# Patient Record
Sex: FEMALE | Race: WHITE | Employment: OTHER | URBAN - METROPOLITAN AREA
[De-identification: names, ages, dates, MRNs, and addresses within clinical notes are randomized per-mention and may not be internally consistent; named-entity substitution may affect disease eponyms.]

---

## 2017-01-05 PROBLEM — H35.372 EPIRETINAL MEMBRANE, LEFT: Status: ACTIVE | Noted: 2017-01-05

## 2017-01-05 PROBLEM — H35.363 DRUSEN (DEGENERATIVE) OF RETINA, BILATERAL: Status: ACTIVE | Noted: 2017-01-05

## 2017-02-03 PROCEDURE — 88305 TISSUE EXAM BY PATHOLOGIST: CPT | Performed by: RADIOLOGY

## 2017-02-15 PROBLEM — M47.814 THORACIC ARTHRITIS: Status: ACTIVE | Noted: 2017-02-15

## 2017-02-15 PROBLEM — M47.812 CERVICAL ARTHRITIS: Status: ACTIVE | Noted: 2017-02-15

## 2017-02-15 PROBLEM — M47.816 ARTHRITIS OF LUMBAR SPINE: Status: ACTIVE | Noted: 2017-02-15

## 2017-02-15 PROBLEM — M85.89 OSTEOPENIA OF MULTIPLE SITES: Status: ACTIVE | Noted: 2017-02-15

## 2017-04-25 PROBLEM — I27.20 PULMONARY HYPERTENSION (HCC): Status: ACTIVE | Noted: 2017-04-25

## 2018-01-17 PROBLEM — M47.819 SPINAL ARTHRITIS: Status: ACTIVE | Noted: 2017-02-15

## 2018-06-14 PROBLEM — Z91.81 AT RISK FOR FALLS: Status: ACTIVE | Noted: 2018-06-14

## 2018-07-30 PROBLEM — R00.2 PALPITATIONS: Status: ACTIVE | Noted: 2018-07-30

## 2018-07-30 PROBLEM — R42 DIZZINESS: Status: ACTIVE | Noted: 2018-07-30

## 2018-08-15 PROBLEM — I48.91 ATRIAL FIBRILLATION, UNSPECIFIED TYPE (HCC): Status: ACTIVE | Noted: 2018-08-15

## 2018-08-15 PROBLEM — R00.2 PALPITATIONS: Status: RESOLVED | Noted: 2018-07-30 | Resolved: 2018-08-15

## 2019-06-18 PROBLEM — M47.819 SPINAL ARTHRITIS: Status: RESOLVED | Noted: 2017-02-15 | Resolved: 2019-06-18

## 2019-07-18 PROBLEM — H35.373 EPIRETINAL MEMBRANE (ERM) OF BOTH EYES: Status: ACTIVE | Noted: 2017-01-05

## 2019-08-27 PROBLEM — Z79.01 LONG TERM (CURRENT) USE OF ANTICOAGULANTS: Status: ACTIVE | Noted: 2019-08-27

## 2019-10-24 PROBLEM — I48.91 ATRIAL FIBRILLATION, UNSPECIFIED TYPE (HCC): Status: RESOLVED | Noted: 2018-08-15 | Resolved: 2019-10-24

## 2019-10-24 PROBLEM — Z79.01 LONG TERM (CURRENT) USE OF ANTICOAGULANTS: Status: RESOLVED | Noted: 2019-08-27 | Resolved: 2019-10-24

## 2020-07-21 PROBLEM — D68.69 SECONDARY HYPERCOAGULABLE STATE (HCC): Status: ACTIVE | Noted: 2020-07-21

## 2020-07-21 PROBLEM — I48.91 ATRIAL FIBRILLATION (HCC): Status: ACTIVE | Noted: 2020-07-21

## 2021-04-27 PROBLEM — M47.816 ARTHRITIS OF LUMBAR SPINE: Status: RESOLVED | Noted: 2017-02-15 | Resolved: 2021-04-27

## 2021-04-27 PROBLEM — R42 DIZZINESS: Status: RESOLVED | Noted: 2018-07-30 | Resolved: 2021-04-27

## 2021-07-29 PROBLEM — I27.20 PULMONARY HYPERTENSION (HCC): Status: RESOLVED | Noted: 2017-04-25 | Resolved: 2021-07-29

## 2022-01-25 PROBLEM — I65.23 MILD ATHEROSCLEROSIS OF CAROTID ARTERY, BILATERAL: Status: ACTIVE | Noted: 2022-01-25

## 2022-01-25 PROBLEM — J43.9 EMPHYSEMA, UNSPECIFIED (HCC): Status: ACTIVE | Noted: 2022-01-25

## 2022-01-26 PROBLEM — M46.92 UNSPECIFIED INFLAMMATORY SPONDYLOPATHY, CERVICAL REGION (HCC): Status: ACTIVE | Noted: 2022-01-26

## 2023-01-01 ENCOUNTER — APPOINTMENT (OUTPATIENT)
Dept: GENERAL RADIOLOGY | Facility: HOSPITAL | Age: 83
End: 2023-01-01
Attending: EMERGENCY MEDICINE
Payer: MEDICARE

## 2023-01-01 ENCOUNTER — APPOINTMENT (OUTPATIENT)
Dept: CT IMAGING | Facility: HOSPITAL | Age: 83
End: 2023-01-01
Attending: STUDENT IN AN ORGANIZED HEALTH CARE EDUCATION/TRAINING PROGRAM
Payer: MEDICARE

## 2023-01-01 ENCOUNTER — HOSPITAL ENCOUNTER (INPATIENT)
Facility: HOSPITAL | Age: 83
LOS: 3 days | Discharge: HOSPICE | End: 2023-01-01
Attending: EMERGENCY MEDICINE | Admitting: INTERNAL MEDICINE
Payer: MEDICARE

## 2023-01-01 ENCOUNTER — HOSPITAL ENCOUNTER (INPATIENT)
Facility: HOSPITAL | Age: 83
LOS: 3 days | End: 2023-01-01
Attending: STUDENT IN AN ORGANIZED HEALTH CARE EDUCATION/TRAINING PROGRAM | Admitting: STUDENT IN AN ORGANIZED HEALTH CARE EDUCATION/TRAINING PROGRAM
Payer: OTHER MISCELLANEOUS

## 2023-01-01 ENCOUNTER — APPOINTMENT (OUTPATIENT)
Dept: GENERAL RADIOLOGY | Facility: HOSPITAL | Age: 83
End: 2023-01-01
Attending: ORTHOPAEDIC SURGERY
Payer: MEDICARE

## 2023-01-01 VITALS
HEIGHT: 57 IN | HEART RATE: 88 BPM | OXYGEN SATURATION: 100 % | BODY MASS INDEX: 24.81 KG/M2 | WEIGHT: 115 LBS | DIASTOLIC BLOOD PRESSURE: 60 MMHG | TEMPERATURE: 97 F | RESPIRATION RATE: 16 BRPM | SYSTOLIC BLOOD PRESSURE: 115 MMHG

## 2023-01-01 VITALS — TEMPERATURE: 97 F

## 2023-01-01 DIAGNOSIS — S72.002A CLOSED LEFT HIP FRACTURE, INITIAL ENCOUNTER (HCC): Primary | ICD-10-CM

## 2023-01-01 LAB
ANION GAP SERPL CALC-SCNC: 1 MMOL/L (ref 0–18)
ANION GAP SERPL CALC-SCNC: 10 MMOL/L (ref 0–18)
ANION GAP SERPL CALC-SCNC: 3 MMOL/L (ref 0–18)
ANION GAP SERPL CALC-SCNC: 3 MMOL/L (ref 0–18)
ANION GAP SERPL CALC-SCNC: 6 MMOL/L (ref 0–18)
ANTIBODY SCREEN: NEGATIVE
APTT PPP: 29.9 SECONDS (ref 23.3–35.6)
APTT PPP: 88.4 SECONDS (ref 23.3–35.6)
BASE EXCESS BLD CALC-SCNC: -3.1 MMOL/L (ref ?–2)
BASOPHILS # BLD AUTO: 0.05 X10(3) UL (ref 0–0.2)
BASOPHILS NFR BLD AUTO: 0.3 %
BILIRUB UR QL: NEGATIVE
BLOOD TYPE BARCODE: 600
BUN BLD-MCNC: 31 MG/DL (ref 7–18)
BUN BLD-MCNC: 33 MG/DL (ref 7–18)
BUN BLD-MCNC: 37 MG/DL (ref 7–18)
BUN BLD-MCNC: 38 MG/DL (ref 7–18)
BUN BLD-MCNC: 38 MG/DL (ref 7–18)
BUN/CREAT SERPL: 23.1 (ref 10–20)
BUN/CREAT SERPL: 23.5 (ref 10–20)
BUN/CREAT SERPL: 23.9 (ref 10–20)
BUN/CREAT SERPL: 24.2 (ref 10–20)
BUN/CREAT SERPL: 25 (ref 10–20)
CALCIUM BLD-MCNC: 8.2 MG/DL (ref 8.5–10.1)
CALCIUM BLD-MCNC: 8.3 MG/DL (ref 8.5–10.1)
CALCIUM BLD-MCNC: 8.6 MG/DL (ref 8.5–10.1)
CALCIUM BLD-MCNC: 8.6 MG/DL (ref 8.5–10.1)
CALCIUM BLD-MCNC: 9.1 MG/DL (ref 8.5–10.1)
CHLORIDE SERPL-SCNC: 108 MMOL/L (ref 98–112)
CHLORIDE SERPL-SCNC: 116 MMOL/L (ref 98–112)
CHLORIDE SERPL-SCNC: 116 MMOL/L (ref 98–112)
CHLORIDE SERPL-SCNC: 117 MMOL/L (ref 98–112)
CHLORIDE SERPL-SCNC: 117 MMOL/L (ref 98–112)
CLARITY UR: CLEAR
CO2 SERPL-SCNC: 21 MMOL/L (ref 21–32)
CO2 SERPL-SCNC: 23 MMOL/L (ref 21–32)
CO2 SERPL-SCNC: 23 MMOL/L (ref 21–32)
CO2 SERPL-SCNC: 24 MMOL/L (ref 21–32)
CO2 SERPL-SCNC: 25 MMOL/L (ref 21–32)
CREAT BLD-MCNC: 1.28 MG/DL
CREAT BLD-MCNC: 1.43 MG/DL
CREAT BLD-MCNC: 1.52 MG/DL
CREAT BLD-MCNC: 1.55 MG/DL
CREAT BLD-MCNC: 1.62 MG/DL
DEPRECATED RDW RBC AUTO: 44.8 FL (ref 35.1–46.3)
DEPRECATED RDW RBC AUTO: 47 FL (ref 35.1–46.3)
DEPRECATED RDW RBC AUTO: 48.7 FL (ref 35.1–46.3)
DEPRECATED RDW RBC AUTO: 51.8 FL (ref 35.1–46.3)
EGFRCR SERPLBLD CKD-EPI 2021: 31 ML/MIN/1.73M2 (ref 60–?)
EGFRCR SERPLBLD CKD-EPI 2021: 33 ML/MIN/1.73M2 (ref 60–?)
EGFRCR SERPLBLD CKD-EPI 2021: 33 ML/MIN/1.73M2 (ref 60–?)
EGFRCR SERPLBLD CKD-EPI 2021: 36 ML/MIN/1.73M2 (ref 60–?)
EGFRCR SERPLBLD CKD-EPI 2021: 41 ML/MIN/1.73M2 (ref 60–?)
EOSINOPHIL # BLD AUTO: 0.07 X10(3) UL (ref 0–0.7)
EOSINOPHIL NFR BLD AUTO: 0.4 %
ERYTHROCYTE [DISTWIDTH] IN BLOOD BY AUTOMATED COUNT: 12.5 % (ref 11–15)
ERYTHROCYTE [DISTWIDTH] IN BLOOD BY AUTOMATED COUNT: 13.1 % (ref 11–15)
ERYTHROCYTE [DISTWIDTH] IN BLOOD BY AUTOMATED COUNT: 13.2 % (ref 11–15)
ERYTHROCYTE [DISTWIDTH] IN BLOOD BY AUTOMATED COUNT: 13.6 % (ref 11–15)
GLUCOSE BLD-MCNC: 103 MG/DL (ref 70–99)
GLUCOSE BLD-MCNC: 130 MG/DL (ref 70–99)
GLUCOSE BLD-MCNC: 181 MG/DL (ref 70–99)
GLUCOSE BLD-MCNC: 95 MG/DL (ref 70–99)
GLUCOSE BLD-MCNC: 98 MG/DL (ref 70–99)
GLUCOSE BLDC GLUCOMTR-MCNC: 137 MG/DL (ref 70–99)
GLUCOSE UR-MCNC: NORMAL MG/DL
HCO3 BLDA-SCNC: 22.3 MEQ/L (ref 21–27)
HCT VFR BLD AUTO: 23.1 %
HCT VFR BLD AUTO: 25.6 %
HCT VFR BLD AUTO: 29.1 %
HCT VFR BLD AUTO: 35.4 %
HGB BLD-MCNC: 11.2 G/DL
HGB BLD-MCNC: 6.9 G/DL
HGB BLD-MCNC: 7.6 G/DL
HGB BLD-MCNC: 7.6 G/DL
HGB BLD-MCNC: 8.9 G/DL
HGB BLD-MCNC: 9 G/DL
HGB UR QL STRIP.AUTO: NEGATIVE
IMM GRANULOCYTES # BLD AUTO: 0.09 X10(3) UL (ref 0–1)
IMM GRANULOCYTES NFR BLD: 0.5 %
KETONES UR-MCNC: NEGATIVE MG/DL
LEUKOCYTE ESTERASE UR QL STRIP.AUTO: NEGATIVE
LYMPHOCYTES # BLD AUTO: 3.07 X10(3) UL (ref 1–4)
LYMPHOCYTES NFR BLD AUTO: 15.7 %
MCH RBC QN AUTO: 30.6 PG (ref 26–34)
MCH RBC QN AUTO: 30.8 PG (ref 26–34)
MCH RBC QN AUTO: 30.9 PG (ref 26–34)
MCH RBC QN AUTO: 30.9 PG (ref 26–34)
MCHC RBC AUTO-ENTMCNC: 29.7 G/DL (ref 31–37)
MCHC RBC AUTO-ENTMCNC: 29.9 G/DL (ref 31–37)
MCHC RBC AUTO-ENTMCNC: 30.6 G/DL (ref 31–37)
MCHC RBC AUTO-ENTMCNC: 31.6 G/DL (ref 31–37)
MCV RBC AUTO: 100 FL
MCV RBC AUTO: 103.6 FL
MCV RBC AUTO: 103.6 FL
MCV RBC AUTO: 97.5 FL
MONOCYTES # BLD AUTO: 1.05 X10(3) UL (ref 0.1–1)
MONOCYTES NFR BLD AUTO: 5.4 %
MRSA DNA SPEC QL NAA+PROBE: NEGATIVE
MRSA NASAL: NEGATIVE
NEUTROPHILS # BLD AUTO: 15.24 X10 (3) UL (ref 1.5–7.7)
NEUTROPHILS # BLD AUTO: 15.24 X10(3) UL (ref 1.5–7.7)
NEUTROPHILS NFR BLD AUTO: 77.7 %
NITRITE UR QL STRIP.AUTO: NEGATIVE
O2 CT BLD-SCNC: 10.2 VOL% (ref 15–23)
OSMOLALITY SERPL CALC.SUM OF ELEC: 301 MOSM/KG (ref 275–295)
OSMOLALITY SERPL CALC.SUM OF ELEC: 303 MOSM/KG (ref 275–295)
OSMOLALITY SERPL CALC.SUM OF ELEC: 305 MOSM/KG (ref 275–295)
OSMOLALITY SERPL CALC.SUM OF ELEC: 305 MOSM/KG (ref 275–295)
OSMOLALITY SERPL CALC.SUM OF ELEC: 309 MOSM/KG (ref 275–295)
OXYGEN LITERS/MINUTE: 8 L/MIN
PCO2 BLDA: 43 MM HG (ref 35–45)
PH BLDA: 7.33 [PH] (ref 7.35–7.45)
PH UR: 5 [PH] (ref 5–8)
PLATELET # BLD AUTO: 124 10(3)UL (ref 150–450)
PLATELET # BLD AUTO: 132 10(3)UL (ref 150–450)
PLATELET # BLD AUTO: 145 10(3)UL (ref 150–450)
PLATELET # BLD AUTO: 236 10(3)UL (ref 150–450)
PO2 BLDA: 49 MM HG (ref 80–100)
POTASSIUM SERPL-SCNC: 4.3 MMOL/L (ref 3.5–5.1)
POTASSIUM SERPL-SCNC: 4.3 MMOL/L (ref 3.5–5.1)
POTASSIUM SERPL-SCNC: 5.3 MMOL/L (ref 3.5–5.1)
POTASSIUM SERPL-SCNC: 5.3 MMOL/L (ref 3.5–5.1)
POTASSIUM SERPL-SCNC: 5.4 MMOL/L (ref 3.5–5.1)
PUNCTURE CHARGE: YES
RBC # BLD AUTO: 2.23 X10(6)UL
RBC # BLD AUTO: 2.47 X10(6)UL
RBC # BLD AUTO: 2.91 X10(6)UL
RBC # BLD AUTO: 3.63 X10(6)UL
RH BLOOD TYPE: NEGATIVE
RH BLOOD TYPE: NEGATIVE
SAO2 % BLDA: 88.5 % (ref 94–100)
SODIUM SERPL-SCNC: 141 MMOL/L (ref 136–145)
SODIUM SERPL-SCNC: 142 MMOL/L (ref 136–145)
SODIUM SERPL-SCNC: 142 MMOL/L (ref 136–145)
SODIUM SERPL-SCNC: 143 MMOL/L (ref 136–145)
SODIUM SERPL-SCNC: 145 MMOL/L (ref 136–145)
SP GR UR STRIP: 1.02 (ref 1–1.03)
STAPH A BY PCR: POSITIVE
UNIT VOLUME: 350 ML
UROBILINOGEN UR STRIP-ACNC: NORMAL
WBC # BLD AUTO: 10.6 X10(3) UL (ref 4–11)
WBC # BLD AUTO: 10.8 X10(3) UL (ref 4–11)
WBC # BLD AUTO: 14.3 X10(3) UL (ref 4–11)
WBC # BLD AUTO: 19.6 X10(3) UL (ref 4–11)

## 2023-01-01 PROCEDURE — 80048 BASIC METABOLIC PNL TOTAL CA: CPT | Performed by: INTERNAL MEDICINE

## 2023-01-01 PROCEDURE — 92610 EVALUATE SWALLOWING FUNCTION: CPT

## 2023-01-01 PROCEDURE — 0QS706Z REPOSITION LEFT UPPER FEMUR WITH INTRAMEDULLARY INTERNAL FIXATION DEVICE, OPEN APPROACH: ICD-10-PCS | Performed by: ORTHOPAEDIC SURGERY

## 2023-01-01 PROCEDURE — 80048 BASIC METABOLIC PNL TOTAL CA: CPT | Performed by: STUDENT IN AN ORGANIZED HEALTH CARE EDUCATION/TRAINING PROGRAM

## 2023-01-01 PROCEDURE — 36600 WITHDRAWAL OF ARTERIAL BLOOD: CPT | Performed by: STUDENT IN AN ORGANIZED HEALTH CARE EDUCATION/TRAINING PROGRAM

## 2023-01-01 PROCEDURE — 87641 MR-STAPH DNA AMP PROBE: CPT | Performed by: EMERGENCY MEDICINE

## 2023-01-01 PROCEDURE — 81003 URINALYSIS AUTO W/O SCOPE: CPT | Performed by: STUDENT IN AN ORGANIZED HEALTH CARE EDUCATION/TRAINING PROGRAM

## 2023-01-01 PROCEDURE — 85018 HEMOGLOBIN: CPT | Performed by: STUDENT IN AN ORGANIZED HEALTH CARE EDUCATION/TRAINING PROGRAM

## 2023-01-01 PROCEDURE — 80048 BASIC METABOLIC PNL TOTAL CA: CPT | Performed by: HOSPITALIST

## 2023-01-01 PROCEDURE — 86900 BLOOD TYPING SEROLOGIC ABO: CPT | Performed by: ORTHOPAEDIC SURGERY

## 2023-01-01 PROCEDURE — BQ111ZZ FLUOROSCOPY OF LEFT HIP USING LOW OSMOLAR CONTRAST: ICD-10-PCS | Performed by: ORTHOPAEDIC SURGERY

## 2023-01-01 PROCEDURE — 94669 MECHANICAL CHEST WALL OSCILL: CPT

## 2023-01-01 PROCEDURE — S0028 INJECTION, FAMOTIDINE, 20 MG: HCPCS

## 2023-01-01 PROCEDURE — 85025 COMPLETE CBC W/AUTO DIFF WBC: CPT | Performed by: EMERGENCY MEDICINE

## 2023-01-01 PROCEDURE — 94799 UNLISTED PULMONARY SVC/PX: CPT

## 2023-01-01 PROCEDURE — 86901 BLOOD TYPING SEROLOGIC RH(D): CPT | Performed by: ORTHOPAEDIC SURGERY

## 2023-01-01 PROCEDURE — 71260 CT THORAX DX C+: CPT | Performed by: STUDENT IN AN ORGANIZED HEALTH CARE EDUCATION/TRAINING PROGRAM

## 2023-01-01 PROCEDURE — 85018 HEMOGLOBIN: CPT | Performed by: INTERNAL MEDICINE

## 2023-01-01 PROCEDURE — 82805 BLOOD GASES W/O2 SATURATION: CPT | Performed by: STUDENT IN AN ORGANIZED HEALTH CARE EDUCATION/TRAINING PROGRAM

## 2023-01-01 PROCEDURE — 73502 X-RAY EXAM HIP UNI 2-3 VIEWS: CPT | Performed by: ORTHOPAEDIC SURGERY

## 2023-01-01 PROCEDURE — 80048 BASIC METABOLIC PNL TOTAL CA: CPT | Performed by: EMERGENCY MEDICINE

## 2023-01-01 PROCEDURE — 85027 COMPLETE CBC AUTOMATED: CPT | Performed by: INTERNAL MEDICINE

## 2023-01-01 PROCEDURE — 85060 BLOOD SMEAR INTERPRETATION: CPT | Performed by: STUDENT IN AN ORGANIZED HEALTH CARE EDUCATION/TRAINING PROGRAM

## 2023-01-01 PROCEDURE — 71045 X-RAY EXAM CHEST 1 VIEW: CPT | Performed by: EMERGENCY MEDICINE

## 2023-01-01 PROCEDURE — 85027 COMPLETE CBC AUTOMATED: CPT | Performed by: STUDENT IN AN ORGANIZED HEALTH CARE EDUCATION/TRAINING PROGRAM

## 2023-01-01 PROCEDURE — 85014 HEMATOCRIT: CPT | Performed by: STUDENT IN AN ORGANIZED HEALTH CARE EDUCATION/TRAINING PROGRAM

## 2023-01-01 PROCEDURE — 76000 FLUOROSCOPY <1 HR PHYS/QHP: CPT | Performed by: ORTHOPAEDIC SURGERY

## 2023-01-01 PROCEDURE — 87641 MR-STAPH DNA AMP PROBE: CPT | Performed by: ORTHOPAEDIC SURGERY

## 2023-01-01 PROCEDURE — 82962 GLUCOSE BLOOD TEST: CPT

## 2023-01-01 PROCEDURE — 73502 X-RAY EXAM HIP UNI 2-3 VIEWS: CPT | Performed by: EMERGENCY MEDICINE

## 2023-01-01 PROCEDURE — 86920 COMPATIBILITY TEST SPIN: CPT

## 2023-01-01 PROCEDURE — 94667 MNPJ CHEST WALL 1ST: CPT

## 2023-01-01 PROCEDURE — 85730 THROMBOPLASTIN TIME PARTIAL: CPT | Performed by: INTERNAL MEDICINE

## 2023-01-01 PROCEDURE — 87640 STAPH A DNA AMP PROBE: CPT | Performed by: EMERGENCY MEDICINE

## 2023-01-01 PROCEDURE — 30233N1 TRANSFUSION OF NONAUTOLOGOUS RED BLOOD CELLS INTO PERIPHERAL VEIN, PERCUTANEOUS APPROACH: ICD-10-PCS | Performed by: STUDENT IN AN ORGANIZED HEALTH CARE EDUCATION/TRAINING PROGRAM

## 2023-01-01 PROCEDURE — 86850 RBC ANTIBODY SCREEN: CPT | Performed by: ORTHOPAEDIC SURGERY

## 2023-01-01 DEVICE — IMPLANTABLE DEVICE: Type: IMPLANTABLE DEVICE | Status: FUNCTIONAL

## 2023-01-01 RX ORDER — AMLODIPINE BESYLATE AND BENAZEPRIL HYDROCHLORIDE 10; 20 MG/1; MG/1
1 CAPSULE ORAL DAILY
Status: DISCONTINUED | OUTPATIENT
Start: 2023-01-01 | End: 2023-01-01 | Stop reason: RX

## 2023-01-01 RX ORDER — MORPHINE SULFATE 2 MG/ML
1 INJECTION, SOLUTION INTRAMUSCULAR; INTRAVENOUS EVERY 2 HOUR PRN
Status: DISCONTINUED | OUTPATIENT
Start: 2023-01-01 | End: 2023-01-01

## 2023-01-01 RX ORDER — NICOTINE POLACRILEX 4 MG
30 LOZENGE BUCCAL
Status: DISCONTINUED | OUTPATIENT
Start: 2023-01-01 | End: 2023-01-01 | Stop reason: HOSPADM

## 2023-01-01 RX ORDER — HALOPERIDOL 5 MG/ML
1 INJECTION INTRAMUSCULAR
Status: DISCONTINUED | OUTPATIENT
Start: 2023-01-01 | End: 2023-01-01

## 2023-01-01 RX ORDER — SODIUM CHLORIDE 9 MG/ML
INJECTION, SOLUTION INTRAVENOUS ONCE
Status: COMPLETED | OUTPATIENT
Start: 2023-01-01 | End: 2023-01-01

## 2023-01-01 RX ORDER — LORAZEPAM 2 MG/ML
2 INJECTION INTRAMUSCULAR EVERY 4 HOURS PRN
Status: DISCONTINUED | OUTPATIENT
Start: 2023-01-01 | End: 2023-01-01

## 2023-01-01 RX ORDER — LORAZEPAM 2 MG/ML
1 INJECTION INTRAMUSCULAR EVERY 4 HOURS PRN
Status: DISCONTINUED | OUTPATIENT
Start: 2023-01-01 | End: 2023-01-01

## 2023-01-01 RX ORDER — MORPHINE SULFATE 2 MG/ML
1 INJECTION, SOLUTION INTRAMUSCULAR; INTRAVENOUS
Status: DISCONTINUED | OUTPATIENT
Start: 2023-01-01 | End: 2023-01-01

## 2023-01-01 RX ORDER — FUROSEMIDE 10 MG/ML
40 INJECTION INTRAMUSCULAR; INTRAVENOUS EVERY 8 HOURS PRN
Status: DISCONTINUED | OUTPATIENT
Start: 2023-01-01 | End: 2023-01-01

## 2023-01-01 RX ORDER — HYDROMORPHONE HYDROCHLORIDE 1 MG/ML
0.2 INJECTION, SOLUTION INTRAMUSCULAR; INTRAVENOUS; SUBCUTANEOUS EVERY 5 MIN PRN
Status: DISCONTINUED | OUTPATIENT
Start: 2023-01-01 | End: 2023-01-01 | Stop reason: HOSPADM

## 2023-01-01 RX ORDER — LORAZEPAM 2 MG/ML
0.5 INJECTION INTRAMUSCULAR EVERY 4 HOURS PRN
Status: DISCONTINUED | OUTPATIENT
Start: 2023-01-01 | End: 2023-01-01

## 2023-01-01 RX ORDER — FUROSEMIDE 40 MG/1
40 TABLET ORAL EVERY 8 HOURS PRN
Status: DISCONTINUED | OUTPATIENT
Start: 2023-01-01 | End: 2023-01-01

## 2023-01-01 RX ORDER — SODIUM CHLORIDE, SODIUM LACTATE, POTASSIUM CHLORIDE, CALCIUM CHLORIDE 600; 310; 30; 20 MG/100ML; MG/100ML; MG/100ML; MG/100ML
INJECTION, SOLUTION INTRAVENOUS CONTINUOUS
Status: DISCONTINUED | OUTPATIENT
Start: 2023-01-01 | End: 2023-01-01 | Stop reason: HOSPADM

## 2023-01-01 RX ORDER — HALOPERIDOL 5 MG/ML
INJECTION INTRAMUSCULAR
Status: COMPLETED
Start: 2023-01-01 | End: 2023-01-01

## 2023-01-01 RX ORDER — HEPARIN SODIUM AND DEXTROSE 10000; 5 [USP'U]/100ML; G/100ML
INJECTION INTRAVENOUS CONTINUOUS
Status: DISCONTINUED | OUTPATIENT
Start: 2023-01-01 | End: 2023-01-01

## 2023-01-01 RX ORDER — AMLODIPINE BESYLATE 10 MG/1
10 TABLET ORAL DAILY
Status: DISCONTINUED | OUTPATIENT
Start: 2023-01-01 | End: 2023-01-01

## 2023-01-01 RX ORDER — HALOPERIDOL 5 MG/ML
1 INJECTION INTRAMUSCULAR EVERY 6 HOURS PRN
Status: DISCONTINUED | OUTPATIENT
Start: 2023-01-01 | End: 2023-01-01

## 2023-01-01 RX ORDER — SODIUM CHLORIDE 0.9 % (FLUSH) 0.9 %
10 SYRINGE (ML) INJECTION AS NEEDED
Status: DISCONTINUED | OUTPATIENT
Start: 2023-01-01 | End: 2023-01-01

## 2023-01-01 RX ORDER — CHOLECALCIFEROL (VITAMIN D3) 25 MCG
1000 TABLET,CHEWABLE ORAL DAILY
COMMUNITY
Start: 2023-01-01 | End: 2023-10-26

## 2023-01-01 RX ORDER — GLYCOPYRROLATE 0.2 MG/ML
0.4 INJECTION, SOLUTION INTRAMUSCULAR; INTRAVENOUS
Status: DISCONTINUED | OUTPATIENT
Start: 2023-01-01 | End: 2023-01-01

## 2023-01-01 RX ORDER — FLUTICASONE FUROATE AND VILANTEROL 200; 25 UG/1; UG/1
1 POWDER RESPIRATORY (INHALATION) DAILY
Status: DISCONTINUED | OUTPATIENT
Start: 2023-01-01 | End: 2023-01-01

## 2023-01-01 RX ORDER — SCOLOPAMINE TRANSDERMAL SYSTEM 1 MG/1
1 PATCH, EXTENDED RELEASE TRANSDERMAL
Status: DISCONTINUED | OUTPATIENT
Start: 2023-01-01 | End: 2023-01-01

## 2023-01-01 RX ORDER — PANTOPRAZOLE SODIUM 40 MG/1
40 TABLET, DELAYED RELEASE ORAL
Status: DISCONTINUED | OUTPATIENT
Start: 2023-01-01 | End: 2023-01-01

## 2023-01-01 RX ORDER — DEXTROSE AND SODIUM CHLORIDE 5; .45 G/100ML; G/100ML
INJECTION, SOLUTION INTRAVENOUS CONTINUOUS
Status: DISCONTINUED | OUTPATIENT
Start: 2023-01-01 | End: 2023-01-01

## 2023-01-01 RX ORDER — HALOPERIDOL 5 MG/ML
2 INJECTION INTRAMUSCULAR
Status: DISCONTINUED | OUTPATIENT
Start: 2023-01-01 | End: 2023-01-01

## 2023-01-01 RX ORDER — NALOXONE HYDROCHLORIDE 0.4 MG/ML
0.08 INJECTION, SOLUTION INTRAMUSCULAR; INTRAVENOUS; SUBCUTANEOUS AS NEEDED
Status: DISCONTINUED | OUTPATIENT
Start: 2023-01-01 | End: 2023-01-01 | Stop reason: HOSPADM

## 2023-01-01 RX ORDER — LORAZEPAM 2 MG/ML
0.5 INJECTION INTRAMUSCULAR 2 TIMES DAILY PRN
Status: DISCONTINUED | OUTPATIENT
Start: 2023-01-01 | End: 2023-01-01

## 2023-01-01 RX ORDER — DEXTROSE MONOHYDRATE 25 G/50ML
50 INJECTION, SOLUTION INTRAVENOUS
Status: DISCONTINUED | OUTPATIENT
Start: 2023-01-01 | End: 2023-01-01 | Stop reason: HOSPADM

## 2023-01-01 RX ORDER — HALOPERIDOL 5 MG/ML
0.5 INJECTION INTRAMUSCULAR EVERY 6 HOURS PRN
Status: DISCONTINUED | OUTPATIENT
Start: 2023-01-01 | End: 2023-01-01

## 2023-01-01 RX ORDER — ACETAMINOPHEN 650 MG/1
650 SUPPOSITORY RECTAL EVERY 6 HOURS SCHEDULED
Status: DISCONTINUED | OUTPATIENT
Start: 2023-01-01 | End: 2023-01-01

## 2023-01-01 RX ORDER — ENOXAPARIN SODIUM 100 MG/ML
1 INJECTION SUBCUTANEOUS EVERY 24 HOURS
Status: DISCONTINUED | OUTPATIENT
Start: 2023-01-01 | End: 2023-01-01

## 2023-01-01 RX ORDER — DOXEPIN HYDROCHLORIDE 50 MG/1
1 CAPSULE ORAL DAILY
Status: DISCONTINUED | OUTPATIENT
Start: 2023-01-01 | End: 2023-01-01

## 2023-01-01 RX ORDER — SODIUM CHLORIDE 9 MG/ML
INJECTION, SOLUTION INTRAVENOUS CONTINUOUS
Status: DISCONTINUED | OUTPATIENT
Start: 2023-01-01 | End: 2023-01-01

## 2023-01-01 RX ORDER — BISACODYL 10 MG
10 SUPPOSITORY, RECTAL RECTAL
Status: DISCONTINUED | OUTPATIENT
Start: 2023-01-01 | End: 2023-01-01

## 2023-01-01 RX ORDER — ONDANSETRON 4 MG/1
4 TABLET, ORALLY DISINTEGRATING ORAL EVERY 6 HOURS PRN
Status: DISCONTINUED | OUTPATIENT
Start: 2023-01-01 | End: 2023-01-01

## 2023-01-01 RX ORDER — SENNOSIDES 8.6 MG
17.2 TABLET ORAL NIGHTLY
Status: DISCONTINUED | OUTPATIENT
Start: 2023-01-01 | End: 2023-01-01

## 2023-01-01 RX ORDER — ONDANSETRON 2 MG/ML
4 INJECTION INTRAMUSCULAR; INTRAVENOUS EVERY 6 HOURS PRN
Status: DISCONTINUED | OUTPATIENT
Start: 2023-01-01 | End: 2023-01-01

## 2023-01-01 RX ORDER — FAMOTIDINE 10 MG/ML
INJECTION, SOLUTION INTRAVENOUS
Status: COMPLETED
Start: 2023-01-01 | End: 2023-01-01

## 2023-01-01 RX ORDER — METHYLPREDNISOLONE SODIUM SUCCINATE 125 MG/2ML
80 INJECTION, POWDER, LYOPHILIZED, FOR SOLUTION INTRAMUSCULAR; INTRAVENOUS ONCE
Status: COMPLETED | OUTPATIENT
Start: 2023-01-01 | End: 2023-01-01

## 2023-01-01 RX ORDER — HYDROMORPHONE HYDROCHLORIDE 1 MG/ML
0.6 INJECTION, SOLUTION INTRAMUSCULAR; INTRAVENOUS; SUBCUTANEOUS EVERY 5 MIN PRN
Status: DISCONTINUED | OUTPATIENT
Start: 2023-01-01 | End: 2023-01-01 | Stop reason: HOSPADM

## 2023-01-01 RX ORDER — MORPHINE SULFATE 10 MG/ML
6 INJECTION, SOLUTION INTRAMUSCULAR; INTRAVENOUS EVERY 10 MIN PRN
Status: DISCONTINUED | OUTPATIENT
Start: 2023-01-01 | End: 2023-01-01 | Stop reason: HOSPADM

## 2023-01-01 RX ORDER — HEPARIN SODIUM AND DEXTROSE 10000; 5 [USP'U]/100ML; G/100ML
18 INJECTION INTRAVENOUS ONCE
Status: COMPLETED | OUTPATIENT
Start: 2023-01-01 | End: 2023-01-01

## 2023-01-01 RX ORDER — MORPHINE SULFATE 2 MG/ML
2 INJECTION, SOLUTION INTRAMUSCULAR; INTRAVENOUS EVERY 2 HOUR PRN
Status: DISCONTINUED | OUTPATIENT
Start: 2023-01-01 | End: 2023-01-01

## 2023-01-01 RX ORDER — KETOTIFEN FUMARATE 0.35 MG/ML
1 SOLUTION/ DROPS OPHTHALMIC 2 TIMES DAILY
Status: DISCONTINUED | OUTPATIENT
Start: 2023-01-01 | End: 2023-01-01

## 2023-01-01 RX ORDER — METOCLOPRAMIDE HYDROCHLORIDE 5 MG/ML
5 INJECTION INTRAMUSCULAR; INTRAVENOUS EVERY 8 HOURS PRN
Status: DISCONTINUED | OUTPATIENT
Start: 2023-01-01 | End: 2023-01-01

## 2023-01-01 RX ORDER — ACETAMINOPHEN 160 MG/5ML
650 SOLUTION ORAL EVERY 6 HOURS SCHEDULED
Status: DISCONTINUED | OUTPATIENT
Start: 2023-01-01 | End: 2023-01-01

## 2023-01-01 RX ORDER — ACETAMINOPHEN 10 MG/ML
1000 INJECTION, SOLUTION INTRAVENOUS EVERY 8 HOURS PRN
Status: DISCONTINUED | OUTPATIENT
Start: 2023-01-01 | End: 2023-01-01

## 2023-01-01 RX ORDER — PRAVASTATIN SODIUM 10 MG
10 TABLET ORAL NIGHTLY
Status: DISCONTINUED | OUTPATIENT
Start: 2023-01-01 | End: 2023-01-01

## 2023-01-01 RX ORDER — DEXTROSE MONOHYDRATE, SODIUM CHLORIDE, AND POTASSIUM CHLORIDE 50; 1.49; 4.5 G/1000ML; G/1000ML; G/1000ML
INJECTION, SOLUTION INTRAVENOUS CONTINUOUS
Status: DISCONTINUED | OUTPATIENT
Start: 2023-01-01 | End: 2023-01-01

## 2023-01-01 RX ORDER — FAMOTIDINE 10 MG/ML
20 INJECTION, SOLUTION INTRAVENOUS ONCE
Status: COMPLETED | OUTPATIENT
Start: 2023-01-01 | End: 2023-01-01

## 2023-01-01 RX ORDER — NICOTINE POLACRILEX 4 MG
15 LOZENGE BUCCAL
Status: DISCONTINUED | OUTPATIENT
Start: 2023-01-01 | End: 2023-01-01 | Stop reason: HOSPADM

## 2023-01-01 RX ORDER — CLINDAMYCIN PHOSPHATE 600 MG/50ML
600 INJECTION INTRAVENOUS
Status: COMPLETED | OUTPATIENT
Start: 2023-01-01 | End: 2023-01-01

## 2023-01-01 RX ORDER — MORPHINE SULFATE 4 MG/ML
4 INJECTION, SOLUTION INTRAMUSCULAR; INTRAVENOUS EVERY 10 MIN PRN
Status: DISCONTINUED | OUTPATIENT
Start: 2023-01-01 | End: 2023-01-01 | Stop reason: HOSPADM

## 2023-01-01 RX ORDER — SERTRALINE HYDROCHLORIDE 25 MG/1
25 TABLET, FILM COATED ORAL DAILY
Status: DISCONTINUED | OUTPATIENT
Start: 2023-01-01 | End: 2023-01-01

## 2023-01-01 RX ORDER — MORPHINE SULFATE 4 MG/ML
2 INJECTION, SOLUTION INTRAMUSCULAR; INTRAVENOUS EVERY 10 MIN PRN
Status: DISCONTINUED | OUTPATIENT
Start: 2023-01-01 | End: 2023-01-01 | Stop reason: HOSPADM

## 2023-01-01 RX ORDER — ACETAMINOPHEN 10 MG/ML
1000 INJECTION, SOLUTION INTRAVENOUS EVERY 6 HOURS PRN
Status: DISCONTINUED | OUTPATIENT
Start: 2023-01-01 | End: 2023-01-01

## 2023-01-01 RX ORDER — ALBUTEROL SULFATE 90 UG/1
2 AEROSOL, METERED RESPIRATORY (INHALATION) EVERY 6 HOURS PRN
Status: DISCONTINUED | OUTPATIENT
Start: 2023-01-01 | End: 2023-01-01

## 2023-01-01 RX ORDER — MORPHINE SULFATE 2 MG/ML
0.5 INJECTION, SOLUTION INTRAMUSCULAR; INTRAVENOUS EVERY 2 HOUR PRN
Status: DISCONTINUED | OUTPATIENT
Start: 2023-01-01 | End: 2023-01-01

## 2023-01-01 RX ORDER — DIPHENHYDRAMINE HYDROCHLORIDE 50 MG/ML
INJECTION INTRAMUSCULAR; INTRAVENOUS
Status: COMPLETED
Start: 2023-01-01 | End: 2023-01-01

## 2023-01-01 RX ORDER — MORPHINE SULFATE 2 MG/ML
2 INJECTION, SOLUTION INTRAMUSCULAR; INTRAVENOUS ONCE
Status: COMPLETED | OUTPATIENT
Start: 2023-01-01 | End: 2023-01-01

## 2023-01-01 RX ORDER — KETOTIFEN FUMARATE 0.35 MG/ML
1 SOLUTION/ DROPS OPHTHALMIC EVERY 12 HOURS
Status: DISCONTINUED | OUTPATIENT
Start: 2023-01-01 | End: 2023-01-01

## 2023-01-01 RX ORDER — HYDROMORPHONE HYDROCHLORIDE 1 MG/ML
0.4 INJECTION, SOLUTION INTRAMUSCULAR; INTRAVENOUS; SUBCUTANEOUS EVERY 5 MIN PRN
Status: DISCONTINUED | OUTPATIENT
Start: 2023-01-01 | End: 2023-01-01 | Stop reason: HOSPADM

## 2023-01-01 RX ORDER — ENOXAPARIN SODIUM 100 MG/ML
1 INJECTION SUBCUTANEOUS EVERY 12 HOURS SCHEDULED
Status: DISCONTINUED | OUTPATIENT
Start: 2023-01-01 | End: 2023-01-01

## 2023-01-01 RX ORDER — ACETAMINOPHEN 10 MG/ML
1000 INJECTION, SOLUTION INTRAVENOUS ONCE
Status: COMPLETED | OUTPATIENT
Start: 2023-01-01 | End: 2023-01-01

## 2023-01-01 RX ORDER — MORPHINE SULFATE 4 MG/ML
4 INJECTION, SOLUTION INTRAMUSCULAR; INTRAVENOUS EVERY 2 HOUR PRN
Status: DISCONTINUED | OUTPATIENT
Start: 2023-01-01 | End: 2023-01-01

## 2023-01-01 RX ORDER — DIPHENHYDRAMINE HYDROCHLORIDE 50 MG/ML
25 INJECTION INTRAMUSCULAR; INTRAVENOUS ONCE
Status: COMPLETED | OUTPATIENT
Start: 2023-01-01 | End: 2023-01-01

## 2023-06-28 ENCOUNTER — ORDER TRANSCRIPTION (OUTPATIENT)
Dept: PHYSICAL THERAPY | Facility: HOSPITAL | Age: 83
End: 2023-06-28

## 2023-06-28 DIAGNOSIS — R13.10 DYSPHAGIA: Primary | ICD-10-CM

## 2023-06-29 ENCOUNTER — TELEPHONE (OUTPATIENT)
Dept: PHYSICAL THERAPY | Facility: HOSPITAL | Age: 83
End: 2023-06-29

## 2023-08-17 ENCOUNTER — HOSPITAL ENCOUNTER (OUTPATIENT)
Dept: GENERAL RADIOLOGY | Facility: HOSPITAL | Age: 83
Discharge: HOME | End: 2023-08-17
Attending: INTERNAL MEDICINE
Payer: MEDICARE

## 2023-08-17 ENCOUNTER — TELEPHONE (OUTPATIENT)
Dept: SPEECH THERAPY | Facility: HOSPITAL | Age: 83
End: 2023-08-17

## 2023-08-17 DIAGNOSIS — R13.12 OROPHARYNGEAL DYSPHAGIA: Primary | ICD-10-CM

## 2023-08-17 DIAGNOSIS — R13.10 DYSPHAGIA: ICD-10-CM

## 2023-08-17 PROCEDURE — 92611 MOTION FLUOROSCOPY/SWALLOW: CPT

## 2023-08-17 PROCEDURE — 74230 X-RAY XM SWLNG FUNCJ C+: CPT | Performed by: INTERNAL MEDICINE

## 2023-08-17 NOTE — PROGRESS NOTES
ADULT VIDEOFLUOROSCOPIC SWALLOWING STUDY       ADULT VIDEOFLUOROSCOPIC SWALLOWING STUDY:   Referring Physician: Tom      Radiologist: Blossmo Romano  Diagnosis: dysphagia    Date of Service: 8/17/2023     PATIENT SUMMARY   Chief Complaint: Family reports coughing and choking with liquids, juicy fruits and occasionally some foods, approximately 1-2 times per day. Coughing improves with small sips. Family also notes difficulty with pills. They have tried giving pills with applesauce, but pt separates the pill from the applesauce. Pt has h/o dementia. Current Diet: regular to soft foods and thin liquids       Problem List  Active Problems:  Active Problems:    * No active hospital problems.  *      Past Medical History  Past Medical History:   Diagnosis Date    Acute exacerbation of COPD with asthma ([address] 2012    [facility]    Allergic rhinitis due to pollen 9/1/2015    Anemia     Arthritis of lumbar spine 2/15/2017    At risk for diabetes mellitus 5/10/2016    At risk for falls 6/14/2018    Bilateral carotid artery disease ([address] 2017    US 2017-50-60%    Bladder pain 2012    Breast cancer ([address] 2004    Left breast lumpectomy    Breast microcalcifications 2013    Stable; right breast    Cervical arthritis 2/15/2017    Cholelithiasis 2013    On CT for Pe at [facility]    Chronic pain 2012    Chronic rhinitis     Yealry sx but fall allergies can trigger asthma    CKD (chronic kidney disease) stage 3, GFR 30-59 ml/min ([facility])     Colon adenomas 2007    COPD     moderate, FEV 1 0.94L (54%), DLCO 43%    GERD     Hiatal hernia 2015     large on chest x ray    Hx of adenomatous colonic polyps 2016    Hyperlipidemia     Hypovitaminosis D     Incontinence 9/1/2015    Insomnia     Kyphosis     Macular pucker     Osteoarthrosis, unspecified whether generalized or localized, unspecified site     Osteopenia determined by x-ray 2015    On chest x ray    Primary insomnia 5/10/2016    Pulmonary hypertension ([address] 2017    Echocardiogram 2017 Shingles 2007    Spinal stenosis of lumbar region     Thoracic arthritis 2/15/2017    Unspecified essential hypertension         Imaging results: no recent CXR    ASSESSMENT   DYSPHAGIA ASSESSMENT  Test completed in conjunction with Radiologist.   Food/Liquid Types Presented: puree, solid, nectar thick liquid, thin liquids, and barium tablet. Study Position and View:  Patient was seated upright and viewed laterally. Pain Assessment: The patient reports pain at a level of 0/10. Oral phase:  Adequate bilabial seal.  Mildly increased oral transit times. Pt initially bit into cracker, but then spit it out, so mastication was not fully assessed. Barium tablet was presented with puree, however, she swallowed the puree without the pill. Water was then given and the patient was able to pass the pill into the pharynx. Intermittently reduced oral control of thin liquids resulted in premature spillage on occasion. Pharyngeal phase:  The pharyngeal response triggered at the tongue base for mildly thick liquids and puree. Thin liquids typically triggered at the valleculae. Reduced excursion of the velum resulted in transient penetration into the nasopharynx with liquids. Laryngeal penetration was observed x1 with thin liquids by cup and appeared to occur during the swallow due to reduced epiglottic laryngeal closure. Material remained in the laryngeal vestibule without immediate reflexive response. Wet cough noted several minutes after the completion of the study. No definite aspiration observed during the exam.  Reduced base of tongue retraction resulted in mild vallecular retention. Esophageal phase:   Adequate flow of bolus through upper esophagus     Penetration Aspiration Scale: 3/8. Material entered the airway, remained above the vocal cords and was not ejected from the airway.     Overall Impression: Mild-moderate oropharyngeal dysphagia characterized by oral ejection of solid, impaired oral transit of pill, reduced velar excursion with transient penetration of liquids into nasopharynx, laryngeal penetration of thin liquids remaining in the laryngeal vestibule and mild vallecular retention. No definite aspiration was viewed on fluoroscopy, however, suspect aspiration after the study as patient with wet vocal quality. Recommend soft, moist, easy to chew solids and thin liquids in small sips. Recommend trial of dysphagia therapy. If coughing/choking persists, liquids should be downgraded to mildly thick. FCM category and level: Swallowing, 4  PLAN   Potential: Fair    Diet Recommendations:  Solids:  Soft, moist, easy to chew solids  Liquids: Thin liquids are recommended, however, consider adding mildly thick liquids to patient's diet such as smoothies, tomato juice, etc.    Recommended compensatory strategies:   Sit upright  Small sips  Small bites  Alternate liquids and solids  Avoid dual consistencies    Medication Administration:  Present with thickened liquids    Further Follow-up:  Recommend 4 sessions of dysphagia therapy    GOALS (to be met 4 visits)  The patient will tolerate soft, easy to chew diet consistency and thin liquids without overt signs or symptoms of aspiration with 100 % accuracy  The patient/family/caregiver will demonstrate understanding and implementation of aspiration precautions and swallow strategies independently   Patient will reduce risk of aspiration by completing dysphagia exercises to 90% accuracy     EDUCATION/INSTRUCTION  Reviewed results and recommendations with patient and family in detail, including fluoroscopy images. Written instructions were provided. Agreement/Understanding verbalized and all questions answered to their apparent satisfaction. INTERDISCIPLINARY COMMUNICATION  Reviewed results with Radiologist; agreement verbalized.         Patient/Family/Caregiver was advised of these findings, precautions, recommendations and treatment options and has agreed to actively participate in planning and for this course of care. Thank you for your referral. Please co-sign or sign and return this letter via fax as soon as possible. If you have any questions, please contact me at . Danita Ortiz MA/[facility]-SLP  Speech Language Pathologist  [address] [place] [address]  [phone]    Electronically signed by therapist: Mickey Bolivar SLP  [de-identified] certification required: Yes  I certify the need for these services furnished under this plan of treatment and while under my care.     X___________________________________________________ Date____________________    Certification From: 1/30/8031  To:11/15/2023

## 2023-08-17 NOTE — PATIENT INSTRUCTIONS
VIDEO SWALLOW STUDY    Diet Recommendations:  Solids: Soft, moist, easy to chew solids  Liquids:  Thin liquids are recommended, however, consider adding mildly thick liquids to patient's diet such as smoothies, tomato juice, etc.    Recommended compensatory strategies:   Sit upright  Small sips  Small bites  Alternate liquids and solids  Avoid dual consistencies    Medication Administration:  Present with thickened liquids    Further Follow-up:  Recommend 4 sessions of dysphagia (SWALLOWING) therapy    Taryn Kraus MA/[facility]-SLP  Speech Language Pathologist  [address] [place] [address]  [phone]

## 2023-10-06 PROBLEM — S72.002A CLOSED LEFT HIP FRACTURE, INITIAL ENCOUNTER (HCC): Status: ACTIVE | Noted: 2023-10-06

## 2023-10-06 PROBLEM — S72.002A CLOSED LEFT HIP FRACTURE, INITIAL ENCOUNTER (HCC): Status: ACTIVE | Noted: 2023-01-01

## 2023-10-06 NOTE — PLAN OF CARE
Patient alert and oriented x 1. Confused. IV tylenol administered for pain as needed. Bedrest. Voiding via purewic. Patient is NPO. SCDs for VTE prophylaxis. Vital signs monitored. Cough and deep breathe. Bed in lowest position, call light within reach, and non skid socks in place for fall precautions. All needs within reach. Family at bedside. Rounding done by nursing staff. Plan is for possible surgery late 10/7 or 10/8 d/t pt taking eliquis last morning. Ortho to consult pt this evening. Problem: Patient Centered Care  Goal: Patient preferences are identified and integrated in the patient's plan of care  Description: Interventions:  - What would you like us to know as we care for you?  Pt has history of dementia   - Provide timely, complete, and accurate information to patient/family  - Incorporate patient and family knowledge, values, beliefs, and cultural backgrounds into the planning and delivery of care  - Encourage patient/family to participate in care and decision-making at the level they choose  - Honor patient and family perspectives and choices  Outcome: Progressing     Problem: Patient/Family Goals  Goal: Patient/Family Long Term Goal  Description: Patient's Long Term Goal: To be discharged    Interventions:  - Pass PT/OT  - Ortho consult  - See additional Care Plan goals for specific interventions  Outcome: Progressing  Goal: Patient/Family Short Term Goal  Description: Patient's Short Term Goal: Manage pain    Interventions:   - Monitor and assess pain   - Administer pain medication as indicated   - See additional Care Plan goals for specific interventions  Outcome: Progressing     Problem: PAIN - ADULT  Goal: Verbalizes/displays adequate comfort level or patient's stated pain goal  Description: INTERVENTIONS:  - Encourage pt to monitor pain and request assistance  - Assess pain using appropriate pain scale  - Administer analgesics based on type and severity of pain and evaluate response  - Implement non-pharmacological measures as appropriate and evaluate response  - Consider cultural and social influences on pain and pain management  - Manage/alleviate anxiety  - Utilize distraction and/or relaxation techniques  - Monitor for opioid side effects  - Notify MD/LIP if interventions unsuccessful or patient reports new pain  - Anticipate increased pain with activity and pre-medicate as appropriate  Outcome: Progressing     Problem: RISK FOR INFECTION - ADULT  Goal: Absence of fever/infection during anticipated neutropenic period  Description: INTERVENTIONS  - Monitor WBC  - Administer growth factors as ordered  - Implement neutropenic guidelines  Outcome: Progressing     Problem: SAFETY ADULT - FALL  Goal: Free from fall injury  Description: INTERVENTIONS:  - Assess pt frequently for physical needs  - Identify cognitive and physical deficits and behaviors that affect risk of falls.   - [place] fall precautions as indicated by assessment.  - Educate pt/family on patient safety including physical limitations  - Instruct pt to call for assistance with activity based on assessment  - Modify environment to reduce risk of injury  - Provide assistive devices as appropriate  - Consider OT/PT consult to assist with strengthening/mobility  - Encourage toileting schedule  Outcome: Progressing     Problem: DISCHARGE PLANNING  Goal: Discharge to home or other facility with appropriate resources  Description: INTERVENTIONS:  - Identify barriers to discharge w/pt and caregiver  - Include patient/family/discharge partner in discharge planning  - Arrange for needed discharge resources and transportation as appropriate  - Identify discharge learning needs (meds, wound care, etc)  - Arrange for interpreters to assist at discharge as needed  - Consider post-discharge preferences of patient/family/discharge partner  - Complete POLST form as appropriate  - Assess patient's ability to be responsible for managing their own health  - Refer to Case Management Department for coordinating discharge planning if the patient needs post-hospital services based on physician/LIP order or complex needs related to functional status, cognitive ability or social support system  Outcome: Progressing

## 2023-10-06 NOTE — ED QUICK NOTES
Orders for admission, patient is aware of plan and ready to go upstairs. Any questions, please call ED RN Daylin Montoya at extension [phone].      Patient Covid vaccination status: Fully vaccinated     COVID Test Ordered in ED: None    COVID Suspicion at Admission: N/A    Running Infusions:  None    Mental Status/LOC at time of transport: Aox2    Other pertinent information:   CIWA score: N/A   NIH score:  N/A
Pure-Wick applied.  Pt tolerated well
XR at bedside.
32w4d

## 2023-10-06 NOTE — PROGRESS NOTES
amLODIPine Besy-Benazepril HCl (LOTREL) 10-20 MG per cap 1 capsule  daily  is Non-Formulary Medication &  Auto-Substituted to  amLODIPine (Norvasc) 10 mg, lisinopril (Prinivil; Zestril) 20 mg for Lotrel 10/20 ([facility] only)  daily Per P&T PROTOCOL

## 2023-10-06 NOTE — PLAN OF CARE
Patient admitted from ED after fall at home. Bed rest. IV infusing. Patient NPO. Patient has dementia baseline. Patients daughter helped with admission and also stated patient has swallow issues and was going to has swallow evaluation done soon. Patient has hard time with medications some have to be given in applesauce with cues to swallow. Patients daughter also states patient is very claustrophobic, does not want blankets on, no slip socks on, patient moaned in pain when we put socks and blue boots on, so we used regular pillow to raise heels off bed. Patient is tolerating SCDs. Patient voiding via purewick. Patient given morphine for pain and is now resting comfortably in bed. Daughter states she will come back later and would like ortho to call her if she is not here. Bed in lowest position and call light within reach. Plan is for ortho consult and possible surgery.    Problem: Patient Centered Care  Goal: Patient preferences are identified and integrated in the patient's plan of care  Description: Interventions:  - What would you like us to know as we care for you?   - Provide timely, complete, and accurate information to patient/family  - Incorporate patient and family knowledge, values, beliefs, and cultural backgrounds into the planning and delivery of care  - Encourage patient/family to participate in care and decision-making at the level they choose  - Honor patient and family perspectives and choices  Outcome: Progressing

## 2023-10-06 NOTE — CM/SW NOTE
MDO for POLST    POLST form is on the chart and will be scanned in once pt is discharged    Per chart review, pt is from home with CG and dtr. Pt is current with [facility] and has WC and equipment through E. Pending medical needs - will continue to follow for DC needs    SW/CM to remain available for support and/or discharge planning. Oli Mcclendon, LSW, MSW ext.  [phone]

## 2023-10-06 NOTE — ED INITIAL ASSESSMENT (HPI)
78y F to ED via EMS from home for fall. Patient fell while using walker, fell to left hip. Family/caregiver able to get her back to bed. They deny head impact. Patient reporting left hip pain to family. Left leg is externally rotated and slightly shortened.

## 2023-10-06 NOTE — SLP NOTE
ADULT SWALLOWING EVALUATION    ASSESSMENT    ASSESSMENT/OVERALL IMPRESSION:  PPE REQUIRED. THIS THERAPIST WORE GLOVES AND MASK FOR DURATION OF EVALUATION. HANDS WASHED UPON ENTRANCE/EXIT. Per MD note, \"The patient is an 75-year-old female with a history of dementia who was at home with her caregiver earlier this evening when she turned lost her balance and fell onto her left side. \"    SLP BSSE orders received and acknowledged. A swallow evaluation warranted per \"difficulty swallowing per family\". Pt only evaluated for safe medication administration as pt NPO for possible procedure. Pt afebrile with weak/clear vocal quality, on 0.5L/Min, with oxygen saturation at 95%. Pt with hx of dysphagia at [facility], outpatient [facility] 8/17/23 with recommendations for soft easy to chew/thin to mildly thick liquids. Pt positioned ~70 degrees in bed d/t pain. Pt alert/cooperative/family present. Pt with complaints of pain, RN aware. Pt unable to complete oral motor examination. Pt presented with trials of puree and moderately thick liquids via tsp. Pt with adequate oral acceptance and bilabial seal across all trials. Pt with reduced bolus formation/propulsion. Pt's swallow response appears delayed with reduced hyolaryngeal elevation/excursion. No clinical signs of aspiration (e.g., immediate/delayed throat clear, immediate/delayed cough, wet vocal quality, increased O2 effort) observed across all trials. 10/6 CXR indicates \"No acute cardiopulmonary process. Emphysematous changes. Large hiatal hernia\". Oxygen status remained stable t/o the entire evaluation. At this time, pt presents with mild/moderate oral dysphagia and probable pharyngeal dysfunction. Recommend remain NPO for possible procedure expect for crushed medication with strict adherence to safe swallowing compensatory strategies. Results and recommendations reviewed with RN, pt, and family. Pt/pt's family v/u to all results/recommendations.  SLP to assess safety for po diet when pt is cleared by MD.           RECOMMENDATIONS   Diet Recommendations - Solids: NPO  Diet Recommendations - Liquids: NPO                              Medication Administration Recommendations: Crushed in puree;Present with thickened liquid  Treatment Plan/Recommendations: SLP to reassess;Aspiration precautions  Discharge Recommendations/Plan: Undetermined    HISTORY   MEDICAL HISTORY  Reason for Referral: Other (Comment) (\"difficulty swallowing per family\")    Problem List  Principal Problem:    Closed left hip fracture, initial encounter [facility])      Past Medical History  Past Medical History:   Diagnosis Date    Acute exacerbation of COPD with asthma  2012    [facility]    Allergic rhinitis due to pollen 9/1/2015    Anemia     Arthritis of lumbar spine 2/15/2017    At risk for diabetes mellitus 5/10/2016    At risk for falls 6/14/2018    Bilateral carotid artery disease ([address] 2017    US 2017-50-60%    Bladder pain 2012    Breast cancer ([address] 2004    Left breast lumpectomy    Breast microcalcifications 2013    Stable; right breast    Cervical arthritis 2/15/2017    Cholelithiasis 2013    On CT for Pe at [facility]    Chronic pain 2012    Chronic rhinitis     Yealry sx but fall allergies can trigger asthma    CKD (chronic kidney disease) stage 3, GFR 30-59 ml/min ([address]     Colon adenomas 2007    COPD     moderate, FEV 1 0.94L (54%), DLCO 43%    GERD     Hiatal hernia 2015     large on chest x ray    Hx of adenomatous colonic polyps 2016    Hyperlipidemia     Hypovitaminosis D     Incontinence 9/1/2015    Insomnia     Kyphosis     Macular pucker     Osteoarthrosis, unspecified whether generalized or localized, unspecified site     Osteopenia determined by x-ray 2015    On chest x ray    Primary insomnia 5/10/2016    Pulmonary hypertension ([address] 2017    Echocardiogram 2017    Shingles 2007    Spinal stenosis of lumbar region     Thoracic arthritis 2/15/2017    Unspecified essential hypertension        Prior Living Situation: Home with support  Diet Prior to Admission: Soft/ Easy to [place]; Thin liquids; Nectar thick liquids/ Mildly thick  Precautions: Aspiration    Patient/Family Goals: did not state this session    SWALLOWING HISTORY  Current Diet Consistency: NPO  Dysphagia History: see above  Imaging Results: see above    SUBJECTIVE       OBJECTIVE   ORAL MOTOR EXAMINATION  Dentition:  (unable to assess)  Symmetry: Unable to assess  Strength: Unable to assess  Tone: Unable to assess  Range of Motion: Unable to assess  Rate of Motion: Unable to assess    Voice Quality: Weak;Clear  Respiratory Status: Nasal cannula; Supplemental O2  Consistencies Trialed: Honey thick liquids;Puree (assess for meds, NPO for possible procedure)  Method of Presentation: Staff/Clinician assistance;Spoon  Patient Positioning: Midline; Reclined (d/t pain)    Oral Phase of Swallow: Impaired  Bolus Retrieval: Intact  Bilabial Seal: Intact  Bolus Formation: Impaired  Bolus Propulsion: Impaired     Retention: Intact    Pharyngeal Phase of Swallow: Impaired  Laryngeal Elevation Timing: Appears impaired  Laryngeal Elevation Strength: Appears impaired  Laryngeal Elevation Coordination: Appears intact  (Please note: Silent aspiration cannot be evaluated clinically. Videofluoroscopic Swallow Study is required to rule-out silent aspiration.)    Esophageal Phase of Swallow: No complaints consistent with possible esophageal involvement  Comments: NA              GOALS  Goal #1 The patient will tolerate crushed medications without overt signs or symptoms of aspiration with 100 % accuracy over 1-2 session(s). In Progress   Goal #2 The patient/family/caregiver will demonstrate understanding and implementation of aspiration precautions and swallow strategies independently over 1-2 session(s).     In Progress   Goal #3 Pt will participate in clinical swallow assessment to determine safety for po diet when cleared by MD  In Progress     FOLLOW UP  Treatment Plan/Recommendations: SLP to reassess;Aspiration precautions  Number of Visits to Meet Established Goals: 1  Follow Up Needed (Documentation Required): Yes  SLP Follow-up Date: 10/07/23    Thank you for your referral.   If you have any questions, please contact KEMI Mckeon. [place] Haile Pathologist  Phone Number Ext. [phone]

## 2023-10-07 ENCOUNTER — ANESTHESIA (OUTPATIENT)
Dept: SURGERY | Facility: HOSPITAL | Age: 83
End: 2023-10-07
Payer: MEDICARE

## 2023-10-07 ENCOUNTER — ANESTHESIA EVENT (OUTPATIENT)
Dept: SURGERY | Facility: HOSPITAL | Age: 83
End: 2023-10-07
Payer: MEDICARE

## 2023-10-07 RX ORDER — PHENYLEPHRINE HCL 10 MG/ML
VIAL (ML) INJECTION AS NEEDED
Status: DISCONTINUED | OUTPATIENT
Start: 2023-10-07 | End: 2023-10-07 | Stop reason: SURG

## 2023-10-07 RX ORDER — DEXAMETHASONE SODIUM PHOSPHATE 4 MG/ML
VIAL (ML) INJECTION AS NEEDED
Status: DISCONTINUED | OUTPATIENT
Start: 2023-10-07 | End: 2023-10-07 | Stop reason: SURG

## 2023-10-07 RX ORDER — ROCURONIUM BROMIDE 10 MG/ML
INJECTION, SOLUTION INTRAVENOUS AS NEEDED
Status: DISCONTINUED | OUTPATIENT
Start: 2023-10-07 | End: 2023-10-07 | Stop reason: SURG

## 2023-10-07 RX ORDER — GLYCOPYRROLATE 0.2 MG/ML
INJECTION, SOLUTION INTRAMUSCULAR; INTRAVENOUS AS NEEDED
Status: DISCONTINUED | OUTPATIENT
Start: 2023-10-07 | End: 2023-10-07 | Stop reason: SURG

## 2023-10-07 RX ORDER — NEOSTIGMINE METHYLSULFATE 1 MG/ML
INJECTION, SOLUTION INTRAVENOUS AS NEEDED
Status: DISCONTINUED | OUTPATIENT
Start: 2023-10-07 | End: 2023-10-07 | Stop reason: SURG

## 2023-10-07 RX ORDER — ETOMIDATE 2 MG/ML
INJECTION INTRAVENOUS AS NEEDED
Status: DISCONTINUED | OUTPATIENT
Start: 2023-10-07 | End: 2023-10-07 | Stop reason: SURG

## 2023-10-07 RX ORDER — ONDANSETRON 2 MG/ML
INJECTION INTRAMUSCULAR; INTRAVENOUS AS NEEDED
Status: DISCONTINUED | OUTPATIENT
Start: 2023-10-07 | End: 2023-10-07 | Stop reason: SURG

## 2023-10-07 RX ORDER — SODIUM CHLORIDE, SODIUM LACTATE, POTASSIUM CHLORIDE, CALCIUM CHLORIDE 600; 310; 30; 20 MG/100ML; MG/100ML; MG/100ML; MG/100ML
INJECTION, SOLUTION INTRAVENOUS CONTINUOUS PRN
Status: DISCONTINUED | OUTPATIENT
Start: 2023-10-07 | End: 2023-10-07 | Stop reason: SURG

## 2023-10-07 RX ORDER — LIDOCAINE HYDROCHLORIDE 10 MG/ML
INJECTION, SOLUTION EPIDURAL; INFILTRATION; INTRACAUDAL; PERINEURAL AS NEEDED
Status: DISCONTINUED | OUTPATIENT
Start: 2023-10-07 | End: 2023-10-07 | Stop reason: SURG

## 2023-10-07 RX ADMIN — CLINDAMYCIN PHOSPHATE 600 MG: 600 INJECTION INTRAVENOUS at 07:49:00

## 2023-10-07 RX ADMIN — NEOSTIGMINE METHYLSULFATE 2 MG: 1 INJECTION, SOLUTION INTRAVENOUS at 08:56:00

## 2023-10-07 RX ADMIN — LIDOCAINE HYDROCHLORIDE 50 MG: 10 INJECTION, SOLUTION EPIDURAL; INFILTRATION; INTRACAUDAL; PERINEURAL at 07:46:00

## 2023-10-07 RX ADMIN — SODIUM CHLORIDE, SODIUM LACTATE, POTASSIUM CHLORIDE, CALCIUM CHLORIDE: 600; 310; 30; 20 INJECTION, SOLUTION INTRAVENOUS at 09:09:00

## 2023-10-07 RX ADMIN — ROCURONIUM BROMIDE 25 MG: 10 INJECTION, SOLUTION INTRAVENOUS at 07:46:00

## 2023-10-07 RX ADMIN — DEXAMETHASONE SODIUM PHOSPHATE 4 MG: 4 MG/ML VIAL (ML) INJECTION at 08:54:00

## 2023-10-07 RX ADMIN — GLYCOPYRROLATE 0.2 MG: 0.2 INJECTION, SOLUTION INTRAMUSCULAR; INTRAVENOUS at 08:56:00

## 2023-10-07 RX ADMIN — PHENYLEPHRINE HCL 100 MCG: 10 MG/ML VIAL (ML) INJECTION at 08:18:00

## 2023-10-07 RX ADMIN — ETOMIDATE 14 MG: 2 INJECTION INTRAVENOUS at 07:46:00

## 2023-10-07 RX ADMIN — ONDANSETRON 4 MG: 2 INJECTION INTRAMUSCULAR; INTRAVENOUS at 08:54:00

## 2023-10-07 RX ADMIN — SODIUM CHLORIDE, SODIUM LACTATE, POTASSIUM CHLORIDE, CALCIUM CHLORIDE: 600; 310; 30; 20 INJECTION, SOLUTION INTRAVENOUS at 07:40:00

## 2023-10-07 NOTE — PROGRESS NOTES
[facility] Pharmacy Note:  Renal Dose Adjustment for enoxaparin (LOVENOX)    Mario Huggins has been prescribed enoxaparin 50 mg subcutaneously every 12 hours. Estimated Creatinine Clearance: 23.7 mL/min (A) (based on SCr of 1.43 mg/dL (H)). Calculated CrCl 20 to 30 mL/min so the dose of Enoxaparin (LOVENOX) has been changed to enoxaparin 50 mg every 24 hours per P&T approved protocol. Pharmacy will continue to follow, and make additional adjustments if needed.       Thank you,  Michaela Herrera, PharmD  10/7/2023 12:26 PM

## 2023-10-07 NOTE — PROGRESS NOTES
Mepilex applied to thoracic spine for blanchable redness. Mepilex in place wo trisha elbows, heels and sacrum. Skin assessed and No open areas noted.

## 2023-10-07 NOTE — PROGRESS NOTES
Patient hypoxic on 3L per NC. Very restless ad unable to follow commands. Haldol IV given. Md Justin Urbina at the bedside. Respiratory at the bedside as well. Unable to get a good reading of O2 sats. Multiple O2 sats sensors and locations (ears, forehead, fingers) used. High flow NC in place with 8L O2 with O2 sats 74-84%%. Venturi mask placed by RT at 15L. O2 sats increased to 92-93%. ABG ordered and abnormal. Bipap ordered. Patient to be transferred to [facility].    Family notified by Md.

## 2023-10-07 NOTE — OPERATIVE REPORT
[facility]    PATIENT'S NAME: Jose Rios   ATTENDING PHYSICIAN: Debbora Boxer, DO   OPERATING PHYSICIAN: Jenny Ríos MD   PATIENT ACCOUNT#:   [id number]    LOCATION:  [address]  MEDICAL RECORD #:   [id number]       YOB: 1940  ADMISSION DATE:       10/06/2023      OPERATION DATE:  10/07/2023    OPERATIVE REPORT      PREOPERATIVE DIAGNOSIS:  Left intertrochanteric hip fracture. POSTOPERATIVE DIAGNOSIS:  Left intertrochanteric hip fracture. PROCEDURE:  Open treatment of a left hip fracture with an intramedullary device. ASSISTANT:  Leila Patel MD    ANESTHESIA:  General.    ESTIMATED BLOOD LOSS:  200 mL. BLOOD PRODUCTS:  None. SPECIMENS:  None. COMPLICATIONS:  None. IMPLANTS:  Synthes 11 mm x 130 degree neck shaft angle, 235 mm in length TFN nail; a 90 mm helical blade; and a 34 mm in length locking screw. INDICATIONS:  Patient is an 38-year-old who suffered a mechanical fall and x-rays revealed a peritrochanteric hip fracture. I recommended surgery. Despite her extensive past medical history and high-risk profile, the benefits of surgery greatly outweigh the risks of surgery to aid in mobilization, ambulation, and pain control. Major risks of surgery include, but are not limited to the following:  Bleeding, infection, blood clot, pulmonary embolus, malunion, nonunion, hip pain, hip stiffness, symptomatic hardware, localized numbness, anesthetic risks, and need for further surgery. All of the patient's family's questions were answered and they expressed full understanding of my explanations and plan of care. Consent was obtained from the family, as patient has advanced dementia. OPERATIVE TECHNIQUE:  The patient was met in the preoperative holding area and the correct site was identified. She was then brought back to the operating room.   General anesthesia was administered and then she was transferred to the fracture table. Bony prominences were padded and limbs secured. The left leg was placed in the traction boot and right lower extremity in a tamra-lithotomy position. Gentle traction was placed to obtain reduction, which was somewhat difficult in nature given the fracture pattern, but once adequate reduction was obtained, we prepped and draped the left hip in the usual sterile fashion. Prior to beginning, a time-out was performed and preoperative antibiotics were infused. We made a linear incision proximal to the tip of the greater trochanter with a 10 blade sharply through both skin and deep fascia. I then palpated the tip of the greater trochanter. Using fluoroscopy for guidance, we placed a guide pin in the center-center position of the femoral shaft. We used an entry reamer and then placed our nail. We then, using the alignment jig and through an accessory incision, placed a cephalomedullary pin into the center-center position of the femoral head. We reamed the lateral cortex and placed our helical blade. We did compress over the fracture site using the instrumentation and then we dynamized the proximal helical blade screw by tightening the set screw and then loosening it just a bit. Distal interlocking screw was placed using the alignment jig in an accessory incision. We then obtained orthogonal fluoroscopy views to ensure adequate reduction and hardware placement. We copiously irrigated and drained the wounds and hemostasis was maintained with a Bovie device. Then, 2-0 Vicryl was used for the subcutaneous layer in interrupted fashion and staples for the skin. A sterile dressing was applied. Patient was then awakened, transferred to her cart, and taken to the recovery room in stable condition. Dictated By Sofia Chavarria MD  d: 10/07/2023 09:04:36  t: 10/07/2023 09:16:17  Job [id number]  CJ/    cc:  Eldonna Felty, DO

## 2023-10-07 NOTE — PROGRESS NOTES
RRT note    Subjective: acute onset hypoxemia. Upon arrival patient restless. Agitated. Difficult to get pulse ox with attempts made on fingers, forehead, ear, toes    Objective:    10/07/23  1425   BP: (!) 135/102   Pulse: 120   Resp:    Temp:      General: agitated  HEENT: normocephalic  Lungs: tachypnic  CV: sinus tachycardia  Extremities: post op L hip  Neuro: not following commands, agitated, opens eyes to visual/physical stimulation    Assessment/Plan:     1) Acute hypoxemic respiratory failure   - New onset hypoxemia  - Initially extremely difficult to get pulse ox, fingers appears well perfused  - RT called and STAT ABG ordered which showed PaO2 of 49 on 8L NC  - place venti mask. Patient is a DNAR/Select however does have claustrophobia and therefore I worry that BiPAP would be difficult.  If needed will use restraints, discussed with daughter  - ddx includes pulmonary fat embolism from fracture, PE, ect  - transfer to ICU step down  - continuous pulse ox  - pulm colleagues consulted, appreciate recommendations    2) Agitation  - continue to utilize PRN haldol    > 35 minutes of critical care time spent    Called patient's daughter and Ny Ross and gave update on status    Dispo: transfer to PCU    Status: guarded    Mc Harvey DO  10/7/2023

## 2023-10-07 NOTE — PLAN OF CARE
Problem: PAIN - ADULT  Goal: Verbalizes/displays adequate comfort level or patient's stated pain goal  Description: INTERVENTIONS:  - Encourage pt to monitor pain and request assistance  - Assess pain using appropriate pain scale  - Administer analgesics based on type and severity of pain and evaluate response  - Implement non-pharmacological measures as appropriate and evaluate response  - Consider cultural and social influences on pain and pain management  - Manage/alleviate anxiety  - Utilize distraction and/or relaxation techniques  - Monitor for opioid side effects  - Notify MD/LIP if interventions unsuccessful or patient reports new pain  - Anticipate increased pain with activity and pre-medicate as appropriate  10/7/2023 1619 by Nikki Narayanan RN  Outcome: Progressing  10/7/2023 1619 by Nikki Narayanan RN  Outcome: Progressing     Problem: SAFETY ADULT - FALL  Goal: Free from fall injury  Description: INTERVENTIONS:  - Assess pt frequently for physical needs  - Identify cognitive and physical deficits and behaviors that affect risk of falls. - [place] fall precautions as indicated by assessment.  - Educate pt/family on patient safety including physical limitations  - Instruct pt to call for assistance with activity based on assessment  - Modify environment to reduce risk of injury  - Provide assistive devices as appropriate  - Consider OT/PT consult to assist with strengthening/mobility  - Encourage toileting schedule  10/7/2023 1619 by Nikki Narayanan RN  Outcome: Progressing  10/7/2023 1619 by Nikki Narayanan RN  Outcome: Progressing   Received patient from recovery room lethargic. Unable to follow commands and disoriented x4. Responded to her name. Dressing to l hip intact ice pack in place. O2 3L O2 per NC. NPO maintained until speech therapy re-evaluation. Oral medications placed on hold including eliquis. Lovenox ordered. SCDs in place. IVF continued. Family at the bedside.

## 2023-10-07 NOTE — BRIEF OP NOTE
Pre-Operative Diagnosis: Hip fracture ([facility] [address] [S72.009A]     Post-Operative Diagnosis: same     Procedure Performed:   LEFT HIP/FEMUR INTRAMEDULLARY NAIL    Surgeon(s) and Role:     * Demetra Royal MD - Primary    Assistant(s):  Surgical Assistant.: Jeni Shelton     Surgical Findings: as dictated     Specimen: none     Estimated Blood Loss: Dionte Link MD  10/7/2023  8:58 AM

## 2023-10-07 NOTE — SLP NOTE
Speech Pathology Service    Attempted to see patient for ongoing clinical swallow evaluation per plan of care. RN at bedside and patient currently not appropriate for PO assessment. Will return as available/appropriate.     Genet Francois M.S. Neela Garner  Speech-Language Pathologist  [phone]

## 2023-10-07 NOTE — ANESTHESIA PROCEDURE NOTES
Airway  Date/Time: 10/7/2023 7:48 AM  Urgency: Elective    Airway not difficult    General Information and Staff    Patient location during procedure: OR  Anesthesiologist: Jewell Wilson MD  Performed: anesthesiologist   Performed by: Jewell Wilson MD  Authorized by: Jewell Wilson MD      Indications and Patient Condition  Indications for airway management: anesthesia  Spontaneous ventilation: present  Sedation level: deep  Preoxygenated: yes  Patient position: sniffing  Mask difficulty assessment: 1 - vent by mask    Final Airway Details  Final airway type: endotracheal airway      Successful airway: ETT  Cuffed: yes   Successful intubation technique: direct laryngoscopy  Endotracheal tube insertion site: oral  Blade: GlideScope  Blade size: #3  ETT size (mm): 7.0    Cormack-Lehane Classification: grade IIA - partial view of glottis  Placement verified by: capnometry   Measured from: teeth  ETT to teeth (cm): 21  Number of attempts at approach: 1

## 2023-10-08 NOTE — DISCHARGE INSTRUCTIONS
Sometimes managing your health at home requires assistance. The [facility] team has recognized your preference to use [facility]. They can be reached by phone at [phone]. The fax number for your reference is [phone]. A representative from the home health agency will contact you or your family to schedule your first visit.

## 2023-10-08 NOTE — CONSULTS
[facility]  Report of Urology Consultation    Mario Huggins Patient Status:  Inpatient    7/10/1940 MRN [id number]   Location [facility] 2W/SW Attending Juwan Madden, [address] Day # 2 PCP Ry Suero MD     Reason for Consultation:  Right hydronephrosis    History of Present Illness:  Mario Huggins is a a(n) 78year old female. She had dementia and presented with a left hip fracture after a fall. She had hip surgery on 10/7. She subsequently had a CT of the chest showing a PE. She was incidentally noted to have right hydronephrosis. She does not have right flank pain.     History:  Past Medical History:   Diagnosis Date    Acute exacerbation of COPD with asthma      [facility]    Allergic rhinitis due to pollen 2015    Anemia     Arthritis of lumbar spine 2/15/2017    At risk for diabetes mellitus 5/10/2016    At risk for falls 2018    Bilateral carotid artery disease ([address] 2017    US %    Bladder pain 2012    Breast cancer ([address]     Left breast lumpectomy    Breast microcalcifications 2013    Stable; right breast    Cervical arthritis 2/15/2017    Cholelithiasis 2013    On CT for Pe at [facility]    Chronic pain 2012    Chronic rhinitis     Yealry sx but fall allergies can trigger asthma    CKD (chronic kidney disease) stage 3, GFR 30-59 ml/min ([facility])     Colon adenomas     COPD     moderate, FEV 1 0.94L (54%), DLCO 43%    GERD     Hiatal hernia 2015     large on chest x ray    Hx of adenomatous colonic polyps     Hyperlipidemia     Hypovitaminosis D     Incontinence 2015    Insomnia     Kyphosis     Macular pucker     Osteoarthrosis, unspecified whether generalized or localized, unspecified site     Osteopenia determined by x-ray     On chest x ray    Primary insomnia 5/10/2016    Pulmonary hypertension ([address] 2017    Echocardiogram 2017    Shingles 2007    Spinal stenosis of lumbar region     Thoracic arthritis 2/15/2017    Unspecified essential hypertension      Past Surgical History:   Procedure Laterality Date    BENIGN BIOPSY RIGHT  02/2017    CATARACT   aug/sept 2006    both eyes    COLONOSCOPY  2007 and 2008    COLONOSCOPY  2010    COLONOSCOPY  2016    Adenoma    COLONOSCOPY,DIAGNOSTIC  9/2/2010    Performed by Kishor Franklin at [facility] BREAST CANCER  2004    LUMPECTOMY LEFT  2004    OTHER SURGICAL HISTORY      fracture left ankle 2007    OTHER SURGICAL HISTORY  10-18-12    cysto Dr Syed Marr LEFT  2004    UPPER GI ENDOSCOPY,BIOPSY  9/2/2010    Performed by Kishor Franklin at [facility]     Family History   Problem Relation Age of Onset    Other (Other) Father         asthma    Other (Other) Mother         dementia    Heart Disease Sister       reports that she quit smoking about 44 years ago. Her smoking use included cigarettes. She has a 70.00 pack-year smoking history. She has never used smokeless tobacco. She reports current alcohol use. She reports that she does not use drugs.     Allergies:    Lisinopril              ANGIOEDEMA  Amoxicillin-Na Daniel*    RASH  Doxepin                     Comment:Became hyper and unable to sleep  Egg                     SWELLING  Influenza Virus Vac*    WHEEZING  Lipitor [Atorvastat*        Comment:UPSET STOMACH  Spiriva [Tiotropium*        Comment:Vision changes, constipation, voice hoarseness  Sulfa Antibiotics       RASH    Medications:    Current Facility-Administered Medications:     dextrose 5%-sodium chloride 0.45% infusion, , Intravenous, Continuous    heparin (Porcine) 23497 units/250mL infusion CONTINUOUS, 200-3,000 Units/hr, Intravenous, Continuous    sodium zirconium cyclosilicate (Lokelma) oral packet 10 g, 10 g, Oral, Q8H    sennosides (Senokot) tab 17.2 mg, 17.2 mg, Oral, Nightly    ondansetron (Zofran) 4 MG/2ML injection 4 mg, 4 mg, Intravenous, Q6H PRN    metoclopramide (Reglan) 5 mg/mL injection 5 mg, 5 mg, Intravenous, Q8H PRN    multivitamin (Tab-A-Fausto/Beta Carotene) tab 1 tablet, 1 tablet, Oral, Daily    [Held by provider] apixaban (Eliquis) tab 2.5 mg, 2.5 mg, Oral, BID    acetaminophen (Ofirmev) 10 mg/mL infusion premix 1,000 mg, 1,000 mg, Intravenous, Q8H PRN    morphINE PF 2 MG/ML injection 1 mg, 1 mg, Intravenous, Q2H PRN **OR** morphINE PF 2 MG/ML injection 2 mg, 2 mg, Intravenous, Q2H PRN **OR** morphINE PF 4 MG/ML injection 4 mg, 4 mg, Intravenous, Q2H PRN    fluticasone furoate-vilanterol (Breo Ellipta) 200-25 MCG/ACT inhaler 1 puff, 1 puff, Inhalation, Daily    haloperidol lactate (Haldol) 5 MG/ML injection 0.5 mg, 0.5 mg, Intravenous, Q6H PRN    albuterol (Ventolin HFA) 108 (90 Base) MCG/ACT inhaler 2 puff, 2 puff, Inhalation, Q6H PRN    ketotifen (Zaditor) 0.025 % ophthalmic solution 1 drop, 1 drop, Both Eyes, Q12H    pantoprazole (Protonix) DR tab 40 mg, 40 mg, Oral, QAM AC    pravastatin (Pravachol) tab 10 mg, 10 mg, Oral, Nightly    sertraline (Zoloft) tab 25 mg, 25 mg, Oral, Daily    lidocaine-menthol 4-1 % patch 1 patch, 1 patch, Transdermal, Daily    mupirocin (Bactroban) 2% nasal ointment 1 Application, 1 Application, Each Nare, BID    Review of Systems:  Pertinent items are noted in HPI. Physical Exam:  Awake, responsive. HEENT: Normocephalic. Abdomen: Non distended. : No flank tenderness. Laboratory Data:  Lab Results   Component Value Date    WBC 10.6 10/08/2023    HGB 7.6 10/08/2023    HGB 7.6 10/08/2023    HCT 25.6 10/08/2023    .0 10/08/2023    CREATSERUM 1.55 10/08/2023    BUN 37 10/08/2023     10/08/2023    K 5.4 10/08/2023     10/08/2023    CO2 23.0 10/08/2023    GLU 98 10/08/2023    CA 8.3 10/08/2023    PTT 29.9 10/08/2023    PGLU 137 10/07/2023       Imaging:  CT Scan CONCLUSION:      Solitary segmental PE in the posterior LLL.   This was reported to [facility] at time of report      Moderate right hydronephrosis seen in the upper abdomen     Impression:  Patient Active Problem List: Intermediate stage nonexudative age-related macular degeneration of both eyes     Spinal stenosis, lumbar region, without neurogenic claudication     CKD (chronic kidney disease) stage 3, GFR 30-59 ml/min ([facility])     COPD (chronic obstructive pulmonary disease) ([facility])     Macular puckering of retina     Essential hypertension     Gastroesophageal reflux disease without esophagitis     Mixed hyperlipidemia     Incontinence     PVD (posterior vitreous detachment), bilateral     Pseudophakia     At risk for diabetes mellitus     Primary insomnia     Allergic rhinitis due to pollen     Hx of adenomatous colonic polyps     Epiretinal membrane (ERM) of both eyes     Drusen (degenerative) of retina, bilateral     Cervical arthritis     Osteopenia of multiple sites     Pulmonary hypertension (HCC)     At risk for falls     Atrial fibrillation ([facility])     Secondary hypercoagulable state ([address]     Mild atherosclerosis of carotid artery, bilateral     Emphysema, unspecified ([facility] [address]     Unspecified inflammatory spondylopathy, cervical region [facility])     Closed left hip fracture, initial encounter ([facility] [address]      Right hydronephrosis    Recommendations:  Her hydronephrosis may be related to a UPJ obstruction as she has no stone or hydroureter on the CT. This may be worked up as an outpatient when here other medical issues are resolved. If her kidney function worsens we can consider cystoscopy, right retrograde pyelogram and stent. Thank you for allowing me to participate in the care of your patient.     Maulik Hensley MD  10/8/2023  11:44 AM

## 2023-10-08 NOTE — CM/SW NOTE
10/08/23 0800   CM/SW Referral Data   Referral Source Physician   Reason for Referral Discharge planning   Informant Daughter   Medical Hx   Does patient have an established PCP? Yes  Charmaine Kwong)   Patient [address]   Patient lives with Daughter;Grandchild   Patient Status Prior to Admission   Independent with ADLs and Mobility No   Pt. requires assistance with Ambulating;Driving;Meals; Bathing;Medications   Services in place prior to admission DME/Supplies at home   Type of DME/Supplies Straight Cane;Standard Walker;Rollator Walker; Wheelchair; Shower Chair   Discharge Needs   Anticipated D/C needs Home health care;Subacute rehab   Choice of Post-Acute Provider   Informed patient of right to choose their preferred provider Yes   List of appropriate post-acute services provided to patient/family with quality data No - Declined list   [facility]family choice [facility]        08:25AM  Received MDO for DC Planning and CG resources. Contacted pt's dtr Jenise via phone. Above assessment completed. Wilma Atkins confirmed pt lives w/ her and her dtr (pt's granddtr). They are pt's primary CG's. Per Wilma Atkins, she also has other support lined up when needed. Pt is not left home alone. Pt has above documented DME as well. Pt does NOT have home Oxygen. Pt has hx w/ EDUARDO at [facility] approx 15 yrs ago. Pt's dtr is open to [facility] again pending recommendations. Jenise informed BRAYAN that pt's neurologist also started a referral with [facility] and Wilma Atkins would like to continue that. BRAYAN explained [facility] referral will be sent to [facility] and they can follow pt's case even if she goes to [facility]. Wilma Atkins is agreeable. SW/CM to follow up for further DC planning as appropriate. Wilma Atkins is aware and agreeable. HH referral sent in [address] [place] [address]. F2F entered/sent. 12:20PM  [facility]  confirmed they will follow pt if she returns home from [address] [place] [address] or goes to [facility]. [facility] reserved in [place].  HH instructions added to pt's AVS.    03:20PM  Received MDO for Hospice consult. Notified Vern Luna w/ [facility]. PLAN: TBD - pending hospice meeting     SW/CM to remain available for support and/or discharge planning.            Daxa Gorman, MSW, [facility]

## 2023-10-08 NOTE — PLAN OF CARE
Problem: Patient Centered Care  Goal: Patient preferences are identified and integrated in the patient's plan of care  Description: Interventions:  - What would you like us to know as we care for you?   - Provide timely, complete, and accurate information to patient/family  - Incorporate patient and family knowledge, values, beliefs, and cultural backgrounds into the planning and delivery of care  - Encourage patient/family to participate in care and decision-making at the level they choose  - Honor patient and family perspectives and choices  Outcome: Progressing     Problem: Patient/Family Goals  Goal: Patient/Family Long Term Goal  Description: Patient's Long Term Goal: To be discharged    Interventions:  - Pass PT/OT  - Ortho consult  - See additional Care Plan goals for specific interventions  Outcome: Progressing  Goal: Patient/Family Short Term Goal  Description: Patient's Short Term Goal: Manage pain    Interventions:   - Monitor and assess pain   - Administer pain medication as indicated   - See additional Care Plan goals for specific interventions  Outcome: Progressing     Problem: PAIN - ADULT  Goal: Verbalizes/displays adequate comfort level or patient's stated pain goal  Description: INTERVENTIONS:  - Encourage pt to monitor pain and request assistance  - Assess pain using appropriate pain scale  - Administer analgesics based on type and severity of pain and evaluate response  - Implement non-pharmacological measures as appropriate and evaluate response  - Consider cultural and social influences on pain and pain management  - Manage/alleviate anxiety  - Utilize distraction and/or relaxation techniques  - Monitor for opioid side effects  - Notify MD/LIP if interventions unsuccessful or patient reports new pain  - Anticipate increased pain with activity and pre-medicate as appropriate  Outcome: Progressing     Problem: RISK FOR INFECTION - ADULT  Goal: Absence of fever/infection during anticipated neutropenic period  Description: INTERVENTIONS  - Monitor WBC  - Administer growth factors as ordered  - Implement neutropenic guidelines  Outcome: Progressing     Problem: SAFETY ADULT - FALL  Goal: Free from fall injury  Description: INTERVENTIONS:  - Assess pt frequently for physical needs  - Identify cognitive and physical deficits and behaviors that affect risk of falls.   - [place] fall precautions as indicated by assessment.  - Educate pt/family on patient safety including physical limitations  - Instruct pt to call for assistance with activity based on assessment  - Modify environment to reduce risk of injury  - Provide assistive devices as appropriate  - Consider OT/PT consult to assist with strengthening/mobility  - Encourage toileting schedule  Outcome: Progressing     Problem: DISCHARGE PLANNING  Goal: Discharge to home or other facility with appropriate resources  Description: INTERVENTIONS:  - Identify barriers to discharge w/pt and caregiver  - Include patient/family/discharge partner in discharge planning  - Arrange for needed discharge resources and transportation as appropriate  - Identify discharge learning needs (meds, wound care, etc)  - Arrange for interpreters to assist at discharge as needed  - Consider post-discharge preferences of patient/family/discharge partner  - Complete POLST form as appropriate  - Assess patient's ability to be responsible for managing their own health  - Refer to Case Management Department for coordinating discharge planning if the patient needs post-hospital services based on physician/LIP order or complex needs related to functional status, cognitive ability or social support system  Outcome: Progressing     Problem: RESPIRATORY - ADULT  Goal: Achieves optimal ventilation and oxygenation  Description: INTERVENTIONS:  - Assess for changes in respiratory status  - Assess for changes in mentation and behavior  - Position to facilitate oxygenation and minimize respiratory effort  - Oxygen supplementation based on oxygen saturation or ABGs  - Provide Smoking Cessation handout, if applicable  - Encourage broncho-pulmonary hygiene including cough, deep breathe, Incentive Spirometry  - Assess the need for suctioning and perform as needed  - Assess and instruct to report SOB or any respiratory difficulty  - Respiratory Therapy support as indicated  - Manage/alleviate anxiety  - Monitor for signs/symptoms of CO2 retention  Outcome: Progressing   Pt arrived to ccu on venturi mask 15L. Weaned to 10L 45% fio2. O2 sat 100%. CT chest + PE. Dr Yael Das notified. Pt is on lovenox. Pt's family at bedside updated about plan of care.

## 2023-10-08 NOTE — PROGRESS NOTES
Orders received and chart reviewed. Pt's HGB 6.9 which is contraindicated for therapeutic intervention. Will hold OT eval this date.       10/08/23 7723   VISIT TYPE   OT Inpatient Visit Type (Documentation Required) Attempted Evaluation

## 2023-10-08 NOTE — PLAN OF CARE
Problem: Patient Centered Care  Goal: Patient preferences are identified and integrated in the patient's plan of care  Description: Interventions:  - What would you like us to know as we care for you?   - Provide timely, complete, and accurate information to patient/family  - Incorporate patient and family knowledge, values, beliefs, and cultural backgrounds into the planning and delivery of care  - Encourage patient/family to participate in care and decision-making at the level they choose  - Honor patient and family perspectives and choices  Outcome: Not Progressing     Problem: Patient/Family Goals  Goal: Patient/Family Long Term Goal  Description: Patient's Long Term Goal: To be discharged    Interventions:  - Pass PT/OT  - Ortho consult  - See additional Care Plan goals for specific interventions  Outcome: Not Progressing  Goal: Patient/Family Short Term Goal  Description: Patient's Short Term Goal: Manage pain    Interventions:   - Monitor and assess pain   - Administer pain medication as indicated   - See additional Care Plan goals for specific interventions  Outcome: Not Progressing     Problem: PAIN - ADULT  Goal: Verbalizes/displays adequate comfort level or patient's stated pain goal  Description: INTERVENTIONS:  - Encourage pt to monitor pain and request assistance  - Assess pain using appropriate pain scale  - Administer analgesics based on type and severity of pain and evaluate response  - Implement non-pharmacological measures as appropriate and evaluate response  - Consider cultural and social influences on pain and pain management  - Manage/alleviate anxiety  - Utilize distraction and/or relaxation techniques  - Monitor for opioid side effects  - Notify MD/LIP if interventions unsuccessful or patient reports new pain  - Anticipate increased pain with activity and pre-medicate as appropriate  Outcome: Not Progressing     Problem: RISK FOR INFECTION - ADULT  Goal: Absence of fever/infection during anticipated neutropenic period  Description: INTERVENTIONS  - Monitor WBC  - Administer growth factors as ordered  - Implement neutropenic guidelines  Outcome: Not Progressing     Problem: SAFETY ADULT - FALL  Goal: Free from fall injury  Description: INTERVENTIONS:  - Assess pt frequently for physical needs  - Identify cognitive and physical deficits and behaviors that affect risk of falls.   - [place] fall precautions as indicated by assessment.  - Educate pt/family on patient safety including physical limitations  - Instruct pt to call for assistance with activity based on assessment  - Modify environment to reduce risk of injury  - Provide assistive devices as appropriate  - Consider OT/PT consult to assist with strengthening/mobility  - Encourage toileting schedule  Outcome: Not Progressing     Problem: DISCHARGE PLANNING  Goal: Discharge to home or other facility with appropriate resources  Description: INTERVENTIONS:  - Identify barriers to discharge w/pt and caregiver  - Include patient/family/discharge partner in discharge planning  - Arrange for needed discharge resources and transportation as appropriate  - Identify discharge learning needs (meds, wound care, etc)  - Arrange for interpreters to assist at discharge as needed  - Consider post-discharge preferences of patient/family/discharge partner  - Complete POLST form as appropriate  - Assess patient's ability to be responsible for managing their own health  - Refer to Case Management Department for coordinating discharge planning if the patient needs post-hospital services based on physician/LIP order or complex needs related to functional status, cognitive ability or social support system  Outcome: Not Progressing     Problem: RESPIRATORY - ADULT  Goal: Achieves optimal ventilation and oxygenation  Description: INTERVENTIONS:  - Assess for changes in respiratory status  - Assess for changes in mentation and behavior  - Position to facilitate oxygenation and minimize respiratory effort  - Oxygen supplementation based on oxygen saturation or ABGs  - Provide Smoking Cessation handout, if applicable  - Encourage broncho-pulmonary hygiene including cough, deep breathe, Incentive Spirometry  - Assess the need for suctioning and perform as needed  - Assess and instruct to report SOB or any respiratory difficulty  - Respiratory Therapy support as indicated  - Manage/alleviate anxiety  - Monitor for signs/symptoms of CO2 retention  Outcome: Not Progressing     Problem: Delirium  Goal: Minimize duration of delirium  Description: Interventions:  - Encourage use of hearing aids, eye glasses  - Promote highest level of mobility daily  - Provide frequent reorientation  - Promote wakefulness i.e. lights on, blinds open  - Promote sleep, encourage patient's normal rest cycle i.e. lights off, TV off, minimize noise and interruptions  - Encourage family to assist in orientation and promotion of home routines  Outcome: Not Progressing   Patient alert and oriented x 1, Venturi mask, and complete care. Patient was very restless, agitated, and pulling at medical equipment during my shift. Haldol given. Patient perked up early this AM - see flowsheets. She was following commands and verbal. IV pain medication given. Hypotensive afterwards - 250 mL bolus given. K  was elevated and hgb was low this AM - MD notified. IVF adjusted and 1 unit PRBC's ordered. Retaining urine overnight - straight cathed. Repeat bladder scan this AM was 39mL. Call light within reach and fall precautions in place.

## 2023-10-08 NOTE — PATIENT CARE CONFERENCE
Family of patient asking for meeting on prognosis and goals of care    I met with Mirian's daughter, Luanne Monreal, and son in law in conference room  Luanne Monreal is her mom's Chadwick Lopez tells me that her mother has been suffering from dementia for several years with worsening since November 2022. Fatuma Payne has a caregiver at home and home palliative care    Patient suffered a mechanical fall resulting in hip fracture  Family decided on surgical intervention for long term pain control/comfort   Post op c/b PE    Luanne Monreal states she feels overwhelmed and is \"tired of seeing her mom suffer\"  She knows her mortality/morbidity with this surgery and recovery are higher, especially with her mental state. This was discussed with surgeon pre op  Luanne Monreal feels her mother would not do well in rehab with her advanced dementia    Currently, Fatuma Payne is aware of her own name and can occasionally answer a simple question  She is agitated and with tremors  She is unable to participate in therapy  She is unwilling to eat    Luanne Monreal knows her mothers wishes are to not have aggressive treatments. They spoke of this several years ago  Luanne Monreal does not want temporary feeding tube    We spoke in detail about non aggressive measures and what the hospice picture would look like with advancing dementia  Luanne Monreal wishes for her mom's quality of life to be first priority    After discussion and update given to Dr Jil Murphy, decision for comfort care made. Plan will be to continue IVF, give as needed meds for pain and agitation, continue o2 and allow hospice to meet with family tomorrow to intiate hospice inpatient vs home depending on criteria. Stop heparin drip and lab draws as well.    DNAR/comfort code status    Akilah Huerta APRN  [facility]    Greater than 80 minutes spent in reviewing case/critical care time/conference with family

## 2023-10-09 PROBLEM — J95.821 RESPIRATORY FAILURE, POST-OPERATIVE (HCC): Status: ACTIVE | Noted: 2023-10-09

## 2023-10-09 PROBLEM — J95.821 RESPIRATORY FAILURE, POST-OPERATIVE (HCC): Status: ACTIVE | Noted: 2023-01-01

## 2023-10-09 NOTE — PLAN OF CARE
Problem: Patient Centered Care  Goal: Patient preferences are identified and integrated in the patient's plan of care  Description: Interventions:  - What would you like us to know as we care for you?   - Provide timely, complete, and accurate information to patient/family  - Incorporate patient and family knowledge, values, beliefs, and cultural backgrounds into the planning and delivery of care  - Encourage patient/family to participate in care and decision-making at the level they choose  - Honor patient and family perspectives and choices  Outcome: Not Progressing     Problem: Patient/Family Goals  Goal: Patient/Family Long Term Goal  Description: Patient's Long Term Goal: To be discharged    Interventions:  - Pass PT/OT  - Ortho consult  - See additional Care Plan goals for specific interventions  Outcome: Not Progressing  Goal: Patient/Family Short Term Goal  Description: Patient's Short Term Goal: Manage pain    Interventions:   - Monitor and assess pain   - Administer pain medication as indicated   - See additional Care Plan goals for specific interventions  Outcome: Not Progressing     Problem: PAIN - ADULT  Goal: Verbalizes/displays adequate comfort level or patient's stated pain goal  Description: INTERVENTIONS:  - Encourage pt to monitor pain and request assistance  - Assess pain using appropriate pain scale  - Administer analgesics based on type and severity of pain and evaluate response  - Implement non-pharmacological measures as appropriate and evaluate response  - Consider cultural and social influences on pain and pain management  - Manage/alleviate anxiety  - Utilize distraction and/or relaxation techniques  - Monitor for opioid side effects  - Notify MD/LIP if interventions unsuccessful or patient reports new pain  - Anticipate increased pain with activity and pre-medicate as appropriate  Outcome: Not Progressing     Problem: RISK FOR INFECTION - ADULT  Goal: Absence of fever/infection during anticipated neutropenic period  Description: INTERVENTIONS  - Monitor WBC  - Administer growth factors as ordered  - Implement neutropenic guidelines  Outcome: Not Progressing     Problem: SAFETY ADULT - FALL  Goal: Free from fall injury  Description: INTERVENTIONS:  - Assess pt frequently for physical needs  - Identify cognitive and physical deficits and behaviors that affect risk of falls. - [place] fall precautions as indicated by assessment.  - Educate pt/family on patient safety including physical limitations  - Instruct pt to call for assistance with activity based on assessment  - Modify environment to reduce risk of injury  - Provide assistive devices as appropriate  - Consider OT/PT consult to assist with strengthening/mobility  - Encourage toileting schedule  Outcome: Not Progressing     Problem: DISCHARGE PLANNING  Goal: Discharge to home or other facility with appropriate resources  Description: INTERVENTIONS:  - Identify barriers to discharge w/pt and caregiver  - Include patient/family/discharge partner in discharge planning  - Arrange for needed discharge resources and transportation as appropriate  - Identify discharge learning needs (meds, wound care, etc)  - Arrange for interpreters to assist at discharge as needed  - Consider post-discharge preferences of patient/family/discharge partner  - Complete POLST form as appropriate  - Assess patient's ability to be responsible for managing their own health  - Refer to Case Management Department for coordinating discharge planning if the patient needs post-hospital services based on physician/LIP order or complex needs related to functional status, cognitive ability or social support system  Outcome: Not Progressing   Patient was restless and agitated attempting to pull at medical equipment. IV ativan given. Pain medss, given PRN. Turned and repositioned. Heel boots in place. [place] care given. Dressing changed - moderate amount of drainage. IVF.  Call light within reach and fall precautions  in place. Hospice meeting today.

## 2023-10-09 NOTE — CM/SW NOTE
Sw and liaison met with family. Pt gip eligible for management of pain and agitation symptoms. Family wanting to focus on comfort. Consents signed.

## 2023-10-09 NOTE — CDS QUERY
Malnutrition  [facility]    Dear Dr. Jeff Childs,   Clinical information (provided below) includes documentation of Severe Malnutrition by the Clinical Dietician. PLEASE INDICATE IF YOU ARE IN AGREEMENT WITH THE FOLLOWING   ASSESSMENT BY THE CONSULTANT:  Severe Malnutrition as documented by the Clinical Dietician on : 10/8/23  [ x ] Yes, I agree with this assessment      [  ] No, I do not agree with this assessment  If not in agreement with this assessment, please provide your independent assessment of the nutritional status:  ____________________________        Documentation from the Medical Record:    Dietician Nutritional Assessment: 10/8/2023: Pt meets severe malnutrition criteria. CRITERIA FOR MALNUTRITION DIAGNOSIS: Criteria for severe malnutrition diagnosis: chronic illness related to energy intake less than 75% for greater than 1 month and muscle mass severe depletion. Nutrition Diagnosis/Problem: Severe Malnutrition r/t  increased energy expenditure d/t COPD + likely decreased ability to consume sufficient energy as evidenced by shortness of breath while eating, episodes of coughing/choking PTA, advanced dementia. Nutrition Focused Physical Exam (NFPE): moderate depletion body fat triceps region and severe depletion muscle mass dorsal starr region. Patient not able to participate well in NFPE. Did examine (R) thigh on bed and likely moderate muscle mass deficit. For questions regarding this query, please contact Clinical Documentation : [email or web address] [phone]  Thank You  THIS FORM IS A PERMANENT PART OF THE MEDICAL RECORD

## 2023-10-09 NOTE — PROGRESS NOTES
10/09/23 0800   VISIT TYPE   SLP Inpatient Visit Type (Documentation Required) Attempted Evaluation   FOLLOW UP/PLAN   Follow Up Needed (Documentation Required) Yes   SLP Follow-up Date 10/10/23     SLP attempt this AM. Per RN, pt not appropriate for re-BSSE at this time. Hospice meeting pending. SLP to follow up as pt appropriate and/or pending GOC. Thank you. Melodie Mcclendon  Speech Language Pathologist  Phone Number [phone]

## 2023-10-09 NOTE — PLAN OF CARE
Problem: Patient Centered Care  Goal: Patient preferences are identified and integrated in the patient's plan of care  Description: Interventions:  - What would you like us to know as we care for you?   - Provide timely, complete, and accurate information to patient/family  - Incorporate patient and family knowledge, values, beliefs, and cultural backgrounds into the planning and delivery of care  - Encourage patient/family to participate in care and decision-making at the level they choose  - Honor patient and family perspectives and choices  Outcome: Progressing     Problem: Patient/Family Goals  Goal: Patient/Family Long Term Goal  Description: Patient's Long Term Goal: To be discharged    Interventions:  - Pass PT/OT  - Ortho consult  - See additional Care Plan goals for specific interventions  Outcome: Progressing  Goal: Patient/Family Short Term Goal  Description: Patient's Short Term Goal: Manage pain    Interventions:   - Monitor and assess pain   - Administer pain medication as indicated   - See additional Care Plan goals for specific interventions  Outcome: Progressing     Problem: PAIN - ADULT  Goal: Verbalizes/displays adequate comfort level or patient's stated pain goal  Description: INTERVENTIONS:  - Encourage pt to monitor pain and request assistance  - Assess pain using appropriate pain scale  - Administer analgesics based on type and severity of pain and evaluate response  - Implement non-pharmacological measures as appropriate and evaluate response  - Consider cultural and social influences on pain and pain management  - Manage/alleviate anxiety  - Utilize distraction and/or relaxation techniques  - Monitor for opioid side effects  - Notify MD/LIP if interventions unsuccessful or patient reports new pain  - Anticipate increased pain with activity and pre-medicate as appropriate  Outcome: Progressing     Problem: RISK FOR INFECTION - ADULT  Goal: Absence of fever/infection during anticipated neutropenic period  Description: INTERVENTIONS  - Monitor WBC  - Administer growth factors as ordered  - Implement neutropenic guidelines  Outcome: Progressing     Problem: SAFETY ADULT - FALL  Goal: Free from fall injury  Description: INTERVENTIONS:  - Assess pt frequently for physical needs  - Identify cognitive and physical deficits and behaviors that affect risk of falls.   - [place] fall precautions as indicated by assessment.  - Educate pt/family on patient safety including physical limitations  - Instruct pt to call for assistance with activity based on assessment  - Modify environment to reduce risk of injury  - Provide assistive devices as appropriate  - Consider OT/PT consult to assist with strengthening/mobility  - Encourage toileting schedule  Outcome: Progressing     Problem: DISCHARGE PLANNING  Goal: Discharge to home or other facility with appropriate resources  Description: INTERVENTIONS:  - Identify barriers to discharge w/pt and caregiver  - Include patient/family/discharge partner in discharge planning  - Arrange for needed discharge resources and transportation as appropriate  - Identify discharge learning needs (meds, wound care, etc)  - Arrange for interpreters to assist at discharge as needed  - Consider post-discharge preferences of patient/family/discharge partner  - Complete POLST form as appropriate  - Assess patient's ability to be responsible for managing their own health  - Refer to Case Management Department for coordinating discharge planning if the patient needs post-hospital services based on physician/LIP order or complex needs related to functional status, cognitive ability or social support system  Outcome: Progressing     Problem: RESPIRATORY - ADULT  Goal: Achieves optimal ventilation and oxygenation  Description: INTERVENTIONS:  - Assess for changes in respiratory status  - Assess for changes in mentation and behavior  - Position to facilitate oxygenation and minimize respiratory effort  - Oxygen supplementation based on oxygen saturation or ABGs  - Provide Smoking Cessation handout, if applicable  - Encourage broncho-pulmonary hygiene including cough, deep breathe, Incentive Spirometry  - Assess the need for suctioning and perform as needed  - Assess and instruct to report SOB or any respiratory difficulty  - Respiratory Therapy support as indicated  - Manage/alleviate anxiety  - Monitor for signs/symptoms of CO2 retention  Outcome: Progressing     Problem: Delirium  Goal: Minimize duration of delirium  Description: Interventions:  - Encourage use of hearing aids, eye glasses  - Promote highest level of mobility daily  - Provide frequent reorientation  - Promote wakefulness i.e. lights on, blinds open  - Promote sleep, encourage patient's normal rest cycle i.e. lights off, TV off, minimize noise and interruptions  - Encourage family to assist in orientation and promotion of home routines  Outcome: Progressing

## 2023-10-10 NOTE — DISCHARGE SUMMARY
General Medicine Discharge Summary     Patient ID:  Mario Huggins  78year old  7/10/1940    Admit date: 10/6/2023    Discharge date and time: 10/9/23    Attending Physician: No att. providers found     Consults: IP CONSULT  [place] CONSULT TO SOCIAL WORK  IP CONSULT TO SOCIAL WORK  IP CONSULT TO PULMONOLOGY  IP CONSULT TO NEPHROLOGY  IP CONSULT TO UROLOGY  IP CONSULT TO HOSPICE  IP CONSULT TO [address]    Primary Care Physician: Charmaine Kwong MD     Reason for admission: L hip fx    Exam  Gen: resting  Pulm: on NC  CV: Heart with regular rate and rhythm  Abd: Abdomen soft,   EXT: no edema     HPI: Patient is a 78year old female with PMH sig for COPD, carotid disease, breast CA, CKD stage III, hyperlipidemia, pulmonary hypertension, paroxysmal atrial fibrillation, and advanced vascular dementia who presented to the hospital due to loss of balance and fall. Patient is minimally verbal at baseline however per daughter will intermittently follow commands. She spends most of her time in bed and sleeping. They do have a caregiver who helps assist her if she needs to get up and for her minimal movement. Yesterday the patient was standing when she turned, lost her balance, and fell onto her left hip. She was subsequently brought to the hospital and x-ray imaging was done which showed an acute left hip fracture. Plan for admission with orthopedic surgery consultation. Hospital Course:   Patient is a 78year old female with PMH sig for COPD, carotid disease, breast CA, CKD stage III, hyperlipidemia, pulmonary hypertension, paroxysmal atrial fibrillation, and advanced vascular dementia who presented to the hospital due to loss of balance and fall. Found to have a L hip fx s/p surgery. Course complicated by worsening hypoxemia, mental status, anemia, ZONIA, and acute PE. Ultimately decision made to transition care to comfort based.  Patient admitted to [facility]     Acute hypoxemic respiratory failure  Acute PE  Acute blood loss anemia  ZONIA on CKD 3, hyperkalemia  R sided hydronephrosis  Acute comminuted displaced intertrochanteric left femoral fracture  Angioedema- resolved  Agitation  Advanced dementia  HTN  COPD  HLD  Carotid disease    Operative Procedures: Procedure(s) (LRB):  LEFT HIP/FEMUR INTRAMEDULLARY NAIL (Left)     Imaging: CT CHEST PE AORTA (IV ONLY) (CPT=71260)    Result Date: 10/7/2023  CONCLUSION:   Solitary segmental PE in the posterior LLL. This was reported to FAN Davis at time of report  Moderate right hydronephrosis seen in the upper abdomen    Dictated by (CST): Jessica Acevedo MD on 10/07/2023 at 5:34 PM     Finalized by (CST): Jessica Acevedo MD on 10/07/2023 at 5:41 PM               Med list     Medication List        ASK your doctor about these medications      Advair -21 MCG/ACT Aero  Generic drug: fluticasone-salmeterol  Inhale 1 puff into the lungs 2 (two) times daily. albuterol 108 (90 Base) MCG/ACT Aers  Commonly known as: Ventolin HFA  Inhale 2 puffs into the lungs every 6 (six) hours as needed for Wheezing. Azelastine HCl 0.05 % Soln  Commonly known as: OPTIVAR  Place 1 drop into both eyes 2 (two) times daily. B-12 1000 MCG Tbcr     BENADRYL ALLERGY OR     CALCIUM + D OR     Cromolyn Sodium 4 % Soln  Commonly known as: OPTICROM  1 DROP TO EACH EYE TWICE DAILY OR AS NEEDED     Eliquis 2.5 MG Tabs  Generic drug: apixaban  Take 1 tablet (2.5 mg total) by mouth 2 (two) times daily. hydrocortisone 1 % Crea  Apply 1 Application topically 2 (two) times daily. Apply to neck     loratadine 10 MG Tabs  Commonly known as: Claritin     multivitamin Tabs     nystatin 100,000 Units/g Crea  Commonly known as: Mycostatin  Apply 1 Application topically 2 (two) times daily. olopatadine 0.1 % Soln  Commonly known as: Patanol  Place 1 drop into both eyes 2 (two) times daily. Omeprazole 40 MG Cpdr  Take 1 capsule (40 mg total) by mouth daily.  Before meal pravastatin 40 MG Tabs  Commonly known as: Pravachol  Take 1 tablet (40 mg total) by mouth daily. sertraline 25 MG Tabs  Commonly known as: Zoloft  Take 1 tablet (25 mg total) by mouth daily.      Tylenol 325 MG Caps  Generic drug: Acetaminophen     Vitamin C 500 MG Tabs  Commonly known as: VITAMIN C     VITAMIN D (CHOLECALCIFEROL) OR                Thank You,    Oren Borden,    Hospitalist with [facility] and Care

## 2023-10-10 NOTE — PLAN OF CARE
Family at bedside. Morphine gtt at 2 mg/hr. 5L O2 for comfort. Noticed patient is restless with repositioning. Appears comfortable at the is time and no distress noted. Gannon in place. Comfort measures to be continued.

## 2023-10-10 NOTE — PROGRESS NOTES
10/09/23 1920   Clinical Encounter Type   Visited With Patient; Family   Routine Visit Introduction   Continue Visiting Yes   Family Spiritual Encounters   Family Participation in [address] During Treatment Consistently   Taxonomy   Intended Effects Demonstrate caring and concern   Methods Offer support; Offer spiritual/Rastafari support   Interventions [place]        was responding to a consult for support. Pt family was at bedside and stated that they would be back the next day and if they need  they would call.     Haley Adamson, [address]

## 2023-10-10 NOTE — PROGRESS NOTES
Double RN skin check done prior to transfer off Unit. Skin check performed by this RN and [facility]     Wounds are as follows: incision left hip , bruising upper extremities.      Will remain available for any further questions or concerns

## 2023-10-10 NOTE — HOSPICE RN NOTE
[facility] Admission Note:      Admitted to [facility] hospice service per [facility]SA[facility]. Monie Simon, CM/SW at approx [phone] this afternoon, after discussing and informing family at patient bedside of hospice services. Patient minimally responsive, does not consistently open eyes to verbal or tactile stimuli however, becomes restless and anxious with stranger's voice. Granddaughter at bedside, confirms patient's anxiety. Granddaughter talked calmly to patient, atteorrectly. Furrowed brow and facial grimace present, moving around in bed,, reaching for side rails, sheets, air, appears restless/anxious at time of admission. Resp unlabored, O2 per [place] at 5L, lungs clear, diminished to bilateral bases, in no apparent distress. , rate 12, no accessory muscle use noted. Heart rate and rhythm regular. Aquacell foam dressing in place to left upper, lateral/anterior thigh, covering staples from hip surgery performed on 10/07/23, dressing dry and intact,    Discussed POC with granddaughter and Alda Blackmon RN. Received CTI from Dr. Marcie Mckay, Initiating comfort order set with Morphine continuous drip at 1mg/hr with Morphine IVP as needed for breakthrough pain. Ativan IVP ordered for anxiety. Encouraged staff and family to call with questions or concerns, all verbalizempted to reorient, effectiveness unknown, this RN held patient's hand and calmly introduced self and rationale for visiting, patient relaxed momentarily, but symptoms of anxiety began as soon as this RN let go of hand, granddaughter states this behavior  is baseline for patient. Does not respond verbally but follows simple commands c understanding. Federica Andres [address] [place] [address]

## 2023-10-11 NOTE — PLAN OF CARE
The patient is on hospice, comfort care measures include use of a cheema, 5L O2. Morphine drip set to 2 mL per hour. Family at bedside. Patient becomes restless with repositioning. Cheema care performed. Left IV infiltrated, currently morphine drip is administered in the right PIV. Appears comfortable at rest with no distress noted.  Patient family informed on PRN medication to manage patient pain

## 2023-10-11 NOTE — PLAN OF CARE
Family at bedside. Morphine gtt remains at 2mg/hr. Robinul for secretions. Patient appears comfortable. Apneic breathing noted. Call light within reach of family.

## 2023-10-11 NOTE — HOSPICE RN NOTE
Hospice RN Rounding Note:    Pt. Unresponsive at this time. RR 8 with 20 second moments of apnea. Pt is laying on bed Respirations are non-labored and shallow. 5L/min oxygen. Hypoactive Bsx4. PPS10%. Pt remains eligible for GIP due to closely titrated IV medications for pain and moprhine 2mg/hr gtt. Spoke with Alton Raymundo RN. Advised to call  for any changes in condition and phone number given.

## 2023-10-12 NOTE — PLAN OF CARE
Family at the bedside. Morphine gtt 2mg/hr. Gannon in place. Patient appears comfortable. Call light within reach.

## 2023-10-12 NOTE — PROGRESS NOTES
Patient  at [facility]. Resuscitation not attempted as patient was DNAR/Comfort. Time Of Death: 18    Family Member Notified: Ronit Osorio (Daughter) at bedside at time of death    MD: Kary Pearson    [address] [facility] Case #: [id number]        Representative Name: Dariana Russell Contacted?  Yes   Name and Badge Number: Geraldine Blue #[id number]

## 2023-10-12 NOTE — PAYOR COMM NOTE
--------------  DISCHARGE REVIEW    Payor: [address] [place], [place] [address]Nilay Dennehotso #:  [id number]  Authorization Number: [id number]    Admit date: 10/6/23  Admit time:   3:40 AM  Discharge Date: 10/9/2023  2:41 PM     Admitting Physician: Maira Stein DO  Attending Physician:  No att. providers found  Primary Care Physician: Omar Nicolas MD          Discharge Summary Notes        Discharge Summary signed by Aleksandr Clemente DO at 10/10/2023 11:17 AM       Author: Aleksandr Clemente DO Specialty: HOSPITALIST Author Type: Physician    Filed: 10/10/2023 11:17 AM Date of Service: 10/9/2023 11:10 AM Status: Addendum    : Aleksandr Clemente DO (Physician)    Related Notes: Original Note by Aleksandr Clemente DO (Physician) filed at 10/10/2023 11:16 AM           General Medicine Discharge Summary     Patient ID:  Collette Martin  78year old  7/10/1940    Admit date: 10/6/2023    Discharge date and time: 10/9/23    Attending Physician: No att. providers found     Consults: IP CONSULT  [address]  TO SOCIAL WORK  IP CONSULT TO SOCIAL WORK  IP CONSULT TO PULMONOLOGY  IP CONSULT TO NEPHROLOGY  IP CONSULT TO UROLOGY  IP CONSULT TO HOSPICE  IP CONSULT TO [address] [place] [address]    Primary Care Physician: Dina Wolfe MD     Reason for admission: L hip fx    Exam  Gen: resting  Pulm: on NC  CV: Heart with regular rate and rhythm  Abd: Abdomen soft,   EXT: no edema     HPI: Patient is a 78year old female with PMH sig for COPD, carotid disease, breast CA, CKD stage III, hyperlipidemia, pulmonary hypertension, paroxysmal atrial fibrillation, and advanced vascular dementia who presented to the hospital due to loss of balance and fall. Patient is minimally verbal at baseline however per daughter will intermittently follow commands. She spends most of her time in bed and sleeping. They do have a caregiver who helps assist her if she needs to get up and for her minimal movement.   Yesterday the patient was standing when she turned, lost her balance, and fell onto her left hip. She was subsequently brought to the hospital and x-ray imaging was done which showed an acute left hip fracture. Plan for admission with orthopedic surgery consultation. Hospital Course:   Patient is a 78year old female with PMH sig for COPD, carotid disease, breast CA, CKD stage III, hyperlipidemia, pulmonary hypertension, paroxysmal atrial fibrillation, and advanced vascular dementia who presented to the hospital due to loss of balance and fall. Found to have a L hip fx s/p surgery. Course complicated by worsening hypoxemia, mental status, anemia, ZONIA, and acute PE. Ultimately decision made to transition care to comfort based. Patient admitted to [facility]     Acute hypoxemic respiratory failure  Acute PE  Acute blood loss anemia  ZONIA on CKD 3, hyperkalemia  R sided hydronephrosis  Acute comminuted displaced intertrochanteric left femoral fracture  Angioedema- resolved  Agitation  Advanced dementia  HTN  COPD  HLD  Carotid disease    Operative Procedures: Procedure(s) (LRB):  LEFT HIP/FEMUR INTRAMEDULLARY NAIL (Left)     Imaging: CT CHEST PE AORTA (IV ONLY) (CPT=71260)    Result Date: 10/7/2023  CONCLUSION:   Solitary segmental PE in the posterior LLL. This was reported to FAN Helm at time of report  Moderate right hydronephrosis seen in the upper abdomen    Dictated by (CST): Zander Shirley MD on 10/07/2023 at 5:34 PM     Finalized by (CST): Zander Shirley MD on 10/07/2023 at 5:41 PM               Med list     Medication List        ASK your doctor about these medications      Advair -21 MCG/ACT Aero  Generic drug: fluticasone-salmeterol  Inhale 1 puff into the lungs 2 (two) times daily. albuterol 108 (90 Base) MCG/ACT Aers  Commonly known as: Ventolin HFA  Inhale 2 puffs into the lungs every 6 (six) hours as needed for Wheezing.      Azelastine HCl 0.05 % Soln  Commonly known as: OPTIVAR  Place 1 drop into both eyes 2 (two) times daily. B-12 1000 MCG Tbcr     BENADRYL ALLERGY OR     CALCIUM + D OR     Cromolyn Sodium 4 % Soln  Commonly known as: OPTICROM  1 DROP TO EACH EYE TWICE DAILY OR AS NEEDED     Eliquis 2.5 MG Tabs  Generic drug: apixaban  Take 1 tablet (2.5 mg total) by mouth 2 (two) times daily. hydrocortisone 1 % Crea  Apply 1 Application topically 2 (two) times daily. Apply to neck     loratadine 10 MG Tabs  Commonly known as: Claritin     multivitamin Tabs     nystatin 100,000 Units/g Crea  Commonly known as: Mycostatin  Apply 1 Application topically 2 (two) times daily. olopatadine 0.1 % Soln  Commonly known as: Patanol  Place 1 drop into both eyes 2 (two) times daily. Omeprazole 40 MG Cpdr  Take 1 capsule (40 mg total) by mouth daily. Before meal     pravastatin 40 MG Tabs  Commonly known as: Pravachol  Take 1 tablet (40 mg total) by mouth daily. sertraline 25 MG Tabs  Commonly known as: Zoloft  Take 1 tablet (25 mg total) by mouth daily.      Tylenol 325 MG Caps  Generic drug: Acetaminophen     Vitamin C 500 MG Tabs  Commonly known as: VITAMIN C     VITAMIN D (CHOLECALCIFEROL) OR                Thank You,    Issac Lozada DO   Hospitalist with [facility] and Care         Electronically signed by Beau Ambriz DO on 10/10/2023 11:17 AM         REVIEWER COMMENTS

## 2023-10-12 NOTE — HOSPICE RN NOTE
Pt passed away peacefully at [phone] surrounded by family. Residential hospice provided emotional support, condolences offered. Family coping as expected. SHANT in [facility] notified.

## (undated) DEVICE — OR TOWEL, 17" X 26" STERILE, BLUE: Brand: PREMIERPRO

## (undated) DEVICE — PAD PERINL PST FOAM DISP FOR AMSCO SURG TBL

## (undated) DEVICE — 3M™ TEGADERM™ TRANSPARENT FILM DRESSING, 1626W, 4 IN X 4-3/4 IN (10 CM X 12 CM), 50 EACH/CARTON, 4 CARTON/CASE: Brand: 3M™ TEGADERM™

## (undated) DEVICE — SUCTION CANISTER, 3000CC,SAFELINER: Brand: DEROYAL

## (undated) DEVICE — SOLUTION IRRIG 1000ML 0.9% NACL USP BTL

## (undated) DEVICE — PREMIERPRO LAP SPONGE 18"X18" STERILE, 5/PK: Brand: PREMIERPRO

## (undated) DEVICE — PAD,ABDOMINAL,8"X7.5",STERILE,LF,1/PK: Brand: MEDLINE

## (undated) DEVICE — PROXIMATE SKIN STAPLERS (35 WIDE) CONTAINS 35 STAINLESS STEEL STAPLES (FIXED HEAD): Brand: PROXIMATE

## (undated) DEVICE — SPONGE GZ 4XL4IN 100% COT 12 PLY TYP VII WVN

## (undated) DEVICE — APPLICATOR SKIN PREP 26ML HI LT ORNG 2% CHG

## (undated) DEVICE — SUTURE VCRL SZ 2-0 L27IN ABSRB UD L24MM FS-1

## (undated) DEVICE — GAMMEX® PI HYBRID SIZE 8, STERILE POWDER-FREE SURGICAL GLOVE, POLYISOPRENE AND NEOPRENE BLEND: Brand: GAMMEX

## (undated) DEVICE — HIP PINNING: Brand: MEDLINE INDUSTRIES, INC.

## (undated) DEVICE — BANDAGE COHESIVE W4INXL5YD TAN E POR SLF ADH

## (undated) NOTE — LETTER
[address]  Authorization for Surgical Operation and Procedure                                                                                           I hereby authorize Larry Vickers MD, my physician and his/her assistants (if applicable), which may include medical students, residents, and/or fellows, to perform the following surgical operation/ procedure and administer such anesthesia as may be determined necessary by my physician: Operation/Procedure name (s) LEFT HIP/FEMUR INTRAMEDULLARY NAIL on [address]    2. I recognize that during the surgical operation/procedure, unforeseen conditions may necessitate additional or different procedures than those listed above. I, therefore, further authorize and request that the above-named surgeon, assistants, or designees perform such procedures as are, in their judgment, necessary and desirable. 3.   My surgeon/physician has discussed prior to my surgery the potential benefits, risks and side effects of this procedure; the likelihood of achieving goals; and potential problems that might occur during recuperation. They also discussed reasonable alternatives to the procedure, including risks, benefits, and side effects related to the alternatives and risks related to not receiving this procedure. I have had all my questions answered and I acknowledge that no guarantee has been made as to the result that may be obtained. 4.   Should the need arise during my operation/procedure, which includes change of level of care prior to discharge, I also consent to the administration of blood and/or blood products. Further, I understand that despite careful testing and screening of blood or blood products by collecting agencies, I may still be subject to ill effects as a result of receiving a blood transfusion and/or blood products.   The following are some, but not all, of the potential risks that can occur: fever and allergic reactions, hemolytic reactions, transmission of diseases such as Hepatitis, AIDS and Cytomegalovirus (CMV) and fluid overload. In the event that I wish to have an autologous transfusion of my own blood, or a directed donor transfusion, I will discuss this with my physician. Check only if Refusing Blood or Blood Products  I understand refusal of blood or blood products as deemed necessary by my physician may have serious consequences to my condition to include possible death. I hereby assume responsibility for my refusal and release the hospital, its personnel, and my physicians from any responsibility for the consequences of my refusal.    o  Refuse   5. I authorize the use of any specimen, organs, tissues, body parts or foreign objects that may be removed from my body during the operation/procedure for diagnosis, research or teaching purposes and their subsequent disposal by hospital authorities. I also authorize the release of specimen test results and/or written reports to my treating physician on the hospital medical staff or other referring or consulting physicians involved in my care, at the discretion of the Pathologist or my treating physician. 6.   I consent to the photographing or videotaping of the operations or procedures to be performed, including appropriate portions of my body for medical, scientific, or educational purposes, provided my identity is not revealed by the pictures or by descriptive texts accompanying them. If the procedure has been photographed/videotaped, the surgeon will obtain the original picture, image, videotape or CD. The hospital will not be responsible for storage, release or maintenance of the picture, image, tape or CD.    7.   I consent to the presence of a  or observers in the operating room as deemed necessary by my physician or their designees.     8.   I recognize that in the event my procedure results in extended X-Ray/fluoroscopy time, I may develop a skin reaction. 9. If I have a Do Not Attempt Resuscitation (DNAR) order in place, that status will be suspended while in the operating room, procedural suite, and during the recovery period unless otherwise explicitly stated by me (or a person authorized to consent on my behalf). The surgeon or my attending physician will determine when the applicable recovery period ends for purposes of reinstating the DNAR order. 10. Patients having a sterilization procedure: I understand that if the procedure is successful the results will be permanent and it will therefore be impossible for me to inseminate, conceive, or bear children. I also understand that the procedure is intended to result in sterility, although the result has not been guaranteed. 11. I acknowledge that my physician has explained sedation/analgesia administration to me including the risk and benefits I consent to the administration of sedation/analgesia as may be necessary or desirable in the judgment of my physician. I CERTIFY THAT I HAVE READ AND FULLY UNDERSTAND THE ABOVE CONSENT TO OPERATION and/or OTHER PROCEDURE.     _________________________________________ _________________________________     ___________________________________  Signature of Patient     Signature of Responsible Person                   Printed Name of Responsible Person                              _________________________________________ ______________________________        ___________________________________  Signature of Witness         Date  Time         Relationship to Patient    STATEMENT OF PHYSICIAN My signature below affirms that prior to the time of the procedure; I have explained to the patient and/or his/her legal representative, the risks and benefits involved in the proposed treatment and any reasonable alternative to the proposed treatment.  I have also explained the risks and benefits involved in refusal of the proposed treatment and alternatives to the proposed treatment and have answered the patient's questions.  If I have a significant financial interest in a co-management agreement or a significant financial interest in any product or implant, or other significant relationship used in this procedure/surgery, I have disclosed this and had a discussion with my patient.     _______________________________________________________________ _____________________________  Rena Dago of Physician)                                                                                         (Date)                                   (Time)  Patient Name: Saqib Tapia    : 7/10/1940   Printed: 10/6/2023      Medical Record #: [id number]                                              Page 1 of 1

## (undated) NOTE — LETTER
Patient Name: Brittany Christensen  YOB: 1940          MRN number:  [id number]  Date:  8/17/2023  Referring Physician: Antonio Malone          ADULT VIDEOFLUOROSCOPIC SWALLOWING STUDY       ADULT VIDEOFLUOROSCOPIC SWALLOWING STUDY:   Referring Physician: Bharat Welsh      Radiologist: Susie Santana  Diagnosis: dysphagia    Date of Service: 8/17/2023     PATIENT SUMMARY   Chief Complaint: Family reports coughing and choking with liquids, juicy fruits and occasionally some foods, approximately 1-2 times per day. Coughing improves with small sips. Family also notes difficulty with pills. They have tried giving pills with applesauce, but pt separates the pill from the applesauce. Pt has h/o dementia. Current Diet: regular to soft foods and thin liquids       Problem List  Active Problems:  Active Problems:    * No active hospital problems.  *      Past Medical History  Past Medical History:   Diagnosis Date    Acute exacerbation of COPD with asthma ([facility] 2012    [facility]    Allergic rhinitis due to pollen 9/1/2015    Anemia     Arthritis of lumbar spine 2/15/2017    At risk for diabetes mellitus 5/10/2016    At risk for falls 6/14/2018    Bilateral carotid artery disease ([address] 2017    US 2017-50-60%    Bladder pain 2012    Breast cancer ([facility] [address] 2004    Left breast lumpectomy    Breast microcalcifications 2013    Stable; right breast    Cervical arthritis 2/15/2017    Cholelithiasis 2013    On CT for Pe at [facility]    Chronic pain 2012    Chronic rhinitis     Yealry sx but fall allergies can trigger asthma    CKD (chronic kidney disease) stage 3, GFR 30-59 ml/min ([facility])     Colon adenomas 2007    COPD     moderate, FEV 1 0.94L (54%), DLCO 43%    GERD     Hiatal hernia 2015     large on chest x ray    Hx of adenomatous colonic polyps 2016    Hyperlipidemia     Hypovitaminosis D     Incontinence 9/1/2015    Insomnia     Kyphosis     Macular pucker     Osteoarthrosis, unspecified whether generalized or localized, unspecified site Osteopenia determined by x-ray 2015    On chest x ray    Primary insomnia 5/10/2016    Pulmonary hypertension ([address] 2017    Echocardiogram 2017    Shingles 2007    Spinal stenosis of lumbar region     Thoracic arthritis 2/15/2017    Unspecified essential hypertension         Imaging results: no recent CXR    ASSESSMENT   DYSPHAGIA ASSESSMENT  Test completed in conjunction with Radiologist.   Food/Liquid Types Presented: puree, solid, nectar thick liquid, thin liquids, and barium tablet. Study Position and View:  Patient was seated upright and viewed laterally. Pain Assessment: The patient reports pain at a level of 0/10. Oral phase:  Adequate bilabial seal.  Mildly increased oral transit times. Pt initially bit into cracker, but then spit it out, so mastication was not fully assessed. Barium tablet was presented with puree, however, she swallowed the puree without the pill. Water was then given and the patient was able to pass the pill into the pharynx. Intermittently reduced oral control of thin liquids resulted in premature spillage on occasion. Pharyngeal phase:  The pharyngeal response triggered at the tongue base for mildly thick liquids and puree. Thin liquids typically triggered at the valleculae. Reduced excursion of the velum resulted in transient penetration into the nasopharynx with liquids. Laryngeal penetration was observed x1 with thin liquids by cup and appeared to occur during the swallow due to reduced epiglottic laryngeal closure. Material remained in the laryngeal vestibule without immediate reflexive response. Wet cough noted several minutes after the completion of the study. No definite aspiration observed during the exam.  Reduced base of tongue retraction resulted in mild vallecular retention. Esophageal phase:   Adequate flow of bolus through upper esophagus     Penetration Aspiration Scale: 3/8.   Material entered the airway, remained above the vocal cords and was not ejected from the airway. Overall Impression: Mild-moderate oropharyngeal dysphagia characterized by oral ejection of solid, impaired oral transit of pill, reduced velar excursion with transient penetration of liquids into nasopharynx, laryngeal penetration of thin liquids remaining in the laryngeal vestibule and mild vallecular retention. No definite aspiration was viewed on fluoroscopy, however, suspect aspiration after the study as patient with wet vocal quality. Recommend soft, moist, easy to chew solids and thin liquids in small sips. Recommend trial of dysphagia therapy. If coughing/choking persists, liquids should be downgraded to mildly thick. FCM category and level: Swallowing, 4  PLAN   Potential: Fair    Diet Recommendations:  Solids:  Soft, moist, easy to chew solids  Liquids: Thin liquids are recommended, however, consider adding mildly thick liquids to patient's diet such as smoothies, tomato juice, etc.    Recommended compensatory strategies:   Sit upright  Small sips  Small bites  Alternate liquids and solids  Avoid dual consistencies    Medication Administration:  Present with thickened liquids    Further Follow-up:  Recommend 4 sessions of dysphagia therapy    GOALS (to be met 4 visits)  The patient will tolerate soft, easy to chew diet consistency and thin liquids without overt signs or symptoms of aspiration with 100 % accuracy  The patient/family/caregiver will demonstrate understanding and implementation of aspiration precautions and swallow strategies independently   Patient will reduce risk of aspiration by completing dysphagia exercises to 90% accuracy     EDUCATION/INSTRUCTION  Reviewed results and recommendations with patient and family in detail, including fluoroscopy images. Written instructions were provided. Agreement/Understanding verbalized and all questions answered to their apparent satisfaction.       INTERDISCIPLINARY COMMUNICATION  Reviewed results with Radiologist; agreement verbalized. Patient/Family/Caregiver was advised of these findings, precautions, recommendations and treatment options and has agreed to actively participate in planning and for this course of care. Thank you for your referral. Please co-sign or sign and return this letter via fax as soon as possible. If you have any questions, please contact me at . Clarissa Cano MA/[email or web address]  Speech Language Pathologist  [address] [place] [address]  [phone]    Electronically signed by therapist: KEMI Wlaker  [de-identified] certification required: Yes  I certify the need for these services furnished under this plan of treatment and while under my care.     X___________________________________________________ Date____________________    Certification From: 2/14/1721  To:11/15/2023

## (undated) NOTE — LETTER
[facility] [address], [place], [place]  [address]  INFORMED CONSENT FOR TRANSFUSION OF BLOOD OR BLOOD PRODUCTS  My physician has informed me of the nature, purpose, benefits and risks of transfusion for blood and blood components that he/she may deem necessary during my treatment or hospitalization. He/she has also discussed alternatives to receiving blood from the voluntary blood supply with me, such as self-donation (autologous) and directed donation (blood donated by family or friends to be used specifically for me). I further understand that while the [address] [place] [address] will attempt to supply any autologous or directed donor blood prior to transfusion of blood from the routine blood supply, medical circumstances may require that other or additional blood components may be required for my care. In giving consent, I acknowledge that my physician has also informed me that despite careful screening and testing in accordance with national and regional regulations, there is still a small risk of transmission of infectious agents including hepatitis, HIV-1/2, cytomegalovirus and other viruses or diseases as yet unknown for which licensed definitive screening tests do not currently exist. Additionally, my physician has informed me of the potential for transfusion reactions not related to an infectious agent. [  ]  Check here for Recurring Outpatient Transfusion Therapy (valid for 1 year) In addition to the above, my physician has informed me that I shall receive numerous transfusions over a period of time and that these can lead to other increased risks. I hereby authorize the transfusion of blood and/or blood products to me as deemed necessary and ordered by physicians participating in my care.  My physician has given me the opportunity to ask questions and any questions asked have been answered to my satisfaction  __________________________________________ ______________________________________________  (Signature of Patient)                                                            (Responsible party in case of Minor,                                                                                                 Incompetent, or unconscious Patient)  ___________________________________________       ________ ______________________________________  (Relationship to Patient)                                                       (Signature of Witness)  ______________________________________________________________________________________________   (Date)                                                                           (Time)  REFUSAL OF CONSENT FOR BLOOD TRANSFUSIONS   Sign only if Refusing   [  ] I understand refusal of blood or blood products as deemed necessary by my physician may have serious consequences to my condition to include possible death.  I hereby assume responsibility for my refusal and release the hospital, its personnel, and my physicians from any responsibility for the consequences of my refusal.    ________________________________________________________________________________  (Signature of Patient)                                                         (Responsible Party/Relationship to Patient)    ________________________________________________________________________________  (Signature of Witness)                                                       (Date/Time)     Patient Name: Bettie Colin     : 7/10/1940                 Printed: 10/8/2023      Medical Record #: [id number]                                 Page 1 of 1